# Patient Record
Sex: FEMALE | Race: BLACK OR AFRICAN AMERICAN | NOT HISPANIC OR LATINO | URBAN - METROPOLITAN AREA
[De-identification: names, ages, dates, MRNs, and addresses within clinical notes are randomized per-mention and may not be internally consistent; named-entity substitution may affect disease eponyms.]

---

## 2018-10-15 ENCOUNTER — EMERGENCY (EMERGENCY)
Facility: HOSPITAL | Age: 33
LOS: 0 days | Discharge: HOME | End: 2018-10-15
Attending: EMERGENCY MEDICINE
Payer: OTHER MISCELLANEOUS

## 2018-10-15 VITALS
DIASTOLIC BLOOD PRESSURE: 60 MMHG | TEMPERATURE: 98 F | SYSTOLIC BLOOD PRESSURE: 108 MMHG | HEART RATE: 74 BPM | OXYGEN SATURATION: 100 %

## 2018-10-15 VITALS — WEIGHT: 141.1 LBS | HEIGHT: 64 IN
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
## 2018-10-15 DIAGNOSIS — M25.531 PAIN IN RIGHT WRIST: ICD-10-CM
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
## 2018-10-15 DIAGNOSIS — S09.90XA UNSPECIFIED INJURY OF HEAD, INITIAL ENCOUNTER: ICD-10-CM

## 2018-10-15 DIAGNOSIS — R51 HEADACHE: ICD-10-CM

## 2018-10-15 DIAGNOSIS — S63.501A UNSPECIFIED SPRAIN OF RIGHT WRIST, INITIAL ENCOUNTER: ICD-10-CM

## 2018-10-15 PROCEDURE — 29125 APPL SHORT ARM SPLINT STATIC: CPT | Mod: RT

## 2018-10-15 PROCEDURE — 99283 EMERGENCY DEPT VISIT LOW MDM: CPT

## 2018-10-15 RX ORDER — ACETAMINOPHEN 500 MG
975 TABLET ORAL ONCE
Qty: 0 | Refills: 0 | Status: COMPLETED | OUTPATIENT
Start: 2018-10-15 | End: 2018-10-15

## 2018-10-15 RX ADMIN — Medication 975 MILLIGRAM(S): at 16:41
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
## 2018-10-15 NOTE — ED STATDOCS - PHYSICAL EXAMINATION
Constitutional: mild distress AAOx3  Eyes: PERRLA EOMI. no occular muscle entrapment. No Rowell's sign.   ENT: No hemotympanum.  Head: Normocephalic atraumatic  Mouth: MMM  Cardiac: regular rate   Resp: Lungs CTAB  GI: Abd s/nt/nd  Neuro: CN2-12 intact  Skin: No rashes. No hemotympanum.   MSK: no midline spinal TTP. +mild TTP along R medial eyebrow. No TTP to maxilla or mandible. +mild R TPP of dorsal radius. no snuffbox, metacarpal TTP. normal pulses. Constitutional: NAD AAOx3  Eyes: PERRLA EOMI. no occular muscle entrapment. No Rowell's sign.   ENT: No hemotympanum.  Head: Normocephalic atraumatic  Mouth: MMM  Cardiac: regular rate   Resp: Lungs CTAB  GI: Abd s/nt/nd  Neuro: CN2-12 intact  Skin: No rashes. No hemotympanum.   MSK: no midline spinal TTP. +mild TTP along R medial eyebrow. No TTP to maxilla or mandible. +mild R TPP of dorsal radius. no snuffbox or metacarpal TTP. normal pulses.

## 2018-10-15 NOTE — ED STATDOCS - NS_ ATTENDINGSCRIBEDETAILS _ED_A_ED_FT
I, Buck Pierce MD,  performed the initial face to face bedside interview with this patient regarding history of present illness, review of symptoms and relevant past medical, social and family history.  I completed an independent physical examination.  I was the initial provider who evaluated this patient.  The history, relevant review of systems, past medical and surgical history, medical decision making, and physical examination was documented by the scribe in my presence and I attest to the accuracy of the documentation.
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
## 2018-10-15 NOTE — ED STATDOCS - NS ED ROS FT
Constitutional: No fever or chills  Eyes: No visual changes  HEENT: No throat pain  CV: No chest pain  Resp: No SOB no cough  GI: No abd pain, nausea or vomiting  : No dysuria  MSK: +R wrist pain.  Skin: No rash  Neuro: +HA.
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
## 2019-04-16 ENCOUNTER — APPOINTMENT (OUTPATIENT)
Age: 34
End: 2019-04-16
Payer: COMMERCIAL

## 2019-04-16 ENCOUNTER — ASOB RESULT (OUTPATIENT)
Age: 34
End: 2019-04-16

## 2019-04-16 PROCEDURE — 76813 OB US NUCHAL MEAS 1 GEST: CPT

## 2019-05-05 ENCOUNTER — EMERGENCY (EMERGENCY)
Facility: HOSPITAL | Age: 34
LOS: 0 days | Discharge: HOME | End: 2019-05-05
Attending: STUDENT IN AN ORGANIZED HEALTH CARE EDUCATION/TRAINING PROGRAM | Admitting: STUDENT IN AN ORGANIZED HEALTH CARE EDUCATION/TRAINING PROGRAM
Payer: COMMERCIAL

## 2019-05-05 VITALS
SYSTOLIC BLOOD PRESSURE: 117 MMHG | HEART RATE: 97 BPM | OXYGEN SATURATION: 100 % | TEMPERATURE: 99 F | DIASTOLIC BLOOD PRESSURE: 77 MMHG | RESPIRATION RATE: 18 BRPM

## 2019-05-05 VITALS — SYSTOLIC BLOOD PRESSURE: 112 MMHG | DIASTOLIC BLOOD PRESSURE: 71 MMHG | HEART RATE: 84 BPM

## 2019-05-05 DIAGNOSIS — Z91.013 ALLERGY TO SEAFOOD: ICD-10-CM

## 2019-05-05 DIAGNOSIS — Z3A.16 16 WEEKS GESTATION OF PREGNANCY: ICD-10-CM

## 2019-05-05 DIAGNOSIS — O26.892 OTHER SPECIFIED PREGNANCY RELATED CONDITIONS, SECOND TRIMESTER: ICD-10-CM

## 2019-05-05 DIAGNOSIS — R10.30 LOWER ABDOMINAL PAIN, UNSPECIFIED: ICD-10-CM

## 2019-05-05 DIAGNOSIS — R82.71 BACTERIURIA: ICD-10-CM

## 2019-05-05 LAB
ALBUMIN SERPL ELPH-MCNC: 4 G/DL — SIGNIFICANT CHANGE UP (ref 3.3–5)
ALP SERPL-CCNC: 57 U/L — SIGNIFICANT CHANGE UP (ref 40–120)
ALT FLD-CCNC: 17 U/L — SIGNIFICANT CHANGE UP (ref 12–78)
ANION GAP SERPL CALC-SCNC: 8 MMOL/L — SIGNIFICANT CHANGE UP (ref 5–17)
APPEARANCE UR: CLEAR — SIGNIFICANT CHANGE UP
AST SERPL-CCNC: 16 U/L — SIGNIFICANT CHANGE UP (ref 15–37)
BACTERIA # UR AUTO: ABNORMAL
BASOPHILS # BLD AUTO: 0.03 K/UL — SIGNIFICANT CHANGE UP (ref 0–0.2)
BASOPHILS NFR BLD AUTO: 0.4 % — SIGNIFICANT CHANGE UP (ref 0–2)
BILIRUB SERPL-MCNC: 0.5 MG/DL — SIGNIFICANT CHANGE UP (ref 0.2–1.2)
BILIRUB UR-MCNC: NEGATIVE — SIGNIFICANT CHANGE UP
BLD GP AB SCN SERPL QL: SIGNIFICANT CHANGE UP
BUN SERPL-MCNC: 6 MG/DL — LOW (ref 7–23)
CALCIUM SERPL-MCNC: 8.7 MG/DL — SIGNIFICANT CHANGE UP (ref 8.5–10.1)
CHLORIDE SERPL-SCNC: 106 MMOL/L — SIGNIFICANT CHANGE UP (ref 96–108)
CO2 SERPL-SCNC: 24 MMOL/L — SIGNIFICANT CHANGE UP (ref 22–31)
COLOR SPEC: YELLOW — SIGNIFICANT CHANGE UP
COMMENT - URINE: SIGNIFICANT CHANGE UP
CREAT SERPL-MCNC: 0.68 MG/DL — SIGNIFICANT CHANGE UP (ref 0.5–1.3)
DIFF PNL FLD: NEGATIVE — SIGNIFICANT CHANGE UP
EOSINOPHIL # BLD AUTO: 0.09 K/UL — SIGNIFICANT CHANGE UP (ref 0–0.5)
EOSINOPHIL NFR BLD AUTO: 1.2 % — SIGNIFICANT CHANGE UP (ref 0–6)
EPI CELLS # UR: SIGNIFICANT CHANGE UP
GLUCOSE SERPL-MCNC: 78 MG/DL — SIGNIFICANT CHANGE UP (ref 70–99)
GLUCOSE UR QL: NEGATIVE MG/DL — SIGNIFICANT CHANGE UP
HCG SERPL-ACNC: HIGH MIU/ML
HCT VFR BLD CALC: 36.1 % — SIGNIFICANT CHANGE UP (ref 34.5–45)
HGB BLD-MCNC: 12.3 G/DL — SIGNIFICANT CHANGE UP (ref 11.5–15.5)
IMM GRANULOCYTES NFR BLD AUTO: 0.3 % — SIGNIFICANT CHANGE UP (ref 0–1.5)
KETONES UR-MCNC: NEGATIVE — SIGNIFICANT CHANGE UP
LEUKOCYTE ESTERASE UR-ACNC: NEGATIVE — SIGNIFICANT CHANGE UP
LYMPHOCYTES # BLD AUTO: 1.31 K/UL — SIGNIFICANT CHANGE UP (ref 1–3.3)
LYMPHOCYTES # BLD AUTO: 16.9 % — SIGNIFICANT CHANGE UP (ref 13–44)
MCHC RBC-ENTMCNC: 33.2 PG — SIGNIFICANT CHANGE UP (ref 27–34)
MCHC RBC-ENTMCNC: 34.1 GM/DL — SIGNIFICANT CHANGE UP (ref 32–36)
MCV RBC AUTO: 97.6 FL — SIGNIFICANT CHANGE UP (ref 80–100)
MONOCYTES # BLD AUTO: 0.61 K/UL — SIGNIFICANT CHANGE UP (ref 0–0.9)
MONOCYTES NFR BLD AUTO: 7.9 % — SIGNIFICANT CHANGE UP (ref 2–14)
NEUTROPHILS # BLD AUTO: 5.71 K/UL — SIGNIFICANT CHANGE UP (ref 1.8–7.4)
NEUTROPHILS NFR BLD AUTO: 73.3 % — SIGNIFICANT CHANGE UP (ref 43–77)
NITRITE UR-MCNC: NEGATIVE — SIGNIFICANT CHANGE UP
NRBC # BLD: 0 /100 WBCS — SIGNIFICANT CHANGE UP (ref 0–0)
PH UR: 6 — SIGNIFICANT CHANGE UP (ref 5–8)
PLATELET # BLD AUTO: 288 K/UL — SIGNIFICANT CHANGE UP (ref 150–400)
POTASSIUM SERPL-MCNC: 3.5 MMOL/L — SIGNIFICANT CHANGE UP (ref 3.5–5.3)
POTASSIUM SERPL-SCNC: 3.5 MMOL/L — SIGNIFICANT CHANGE UP (ref 3.5–5.3)
PROT SERPL-MCNC: 8.2 GM/DL — SIGNIFICANT CHANGE UP (ref 6–8.3)
PROT UR-MCNC: 15 MG/DL
RBC # BLD: 3.7 M/UL — LOW (ref 3.8–5.2)
RBC # FLD: 13.2 % — SIGNIFICANT CHANGE UP (ref 10.3–14.5)
RBC CASTS # UR COMP ASSIST: SIGNIFICANT CHANGE UP /HPF (ref 0–4)
SODIUM SERPL-SCNC: 138 MMOL/L — SIGNIFICANT CHANGE UP (ref 135–145)
SP GR SPEC: 1.02 — SIGNIFICANT CHANGE UP (ref 1.01–1.02)
TYPE + AB SCN PNL BLD: SIGNIFICANT CHANGE UP
UROBILINOGEN FLD QL: 1 MG/DL
WBC # BLD: 7.77 K/UL — SIGNIFICANT CHANGE UP (ref 3.8–10.5)
WBC # FLD AUTO: 7.77 K/UL — SIGNIFICANT CHANGE UP (ref 3.8–10.5)
WBC UR QL: SIGNIFICANT CHANGE UP

## 2019-05-05 PROCEDURE — 99284 EMERGENCY DEPT VISIT MOD MDM: CPT

## 2019-05-05 PROCEDURE — 76815 OB US LIMITED FETUS(S): CPT | Mod: 26

## 2019-05-05 RX ORDER — CEPHALEXIN 500 MG
1 CAPSULE ORAL
Qty: 14 | Refills: 0 | OUTPATIENT
Start: 2019-05-05 | End: 2019-05-11

## 2019-05-05 RX ORDER — SODIUM CHLORIDE 9 MG/ML
1000 INJECTION INTRAMUSCULAR; INTRAVENOUS; SUBCUTANEOUS ONCE
Qty: 0 | Refills: 0 | Status: COMPLETED | OUTPATIENT
Start: 2019-05-05 | End: 2019-05-05

## 2019-05-05 RX ORDER — SODIUM CHLORIDE 9 MG/ML
1000 INJECTION INTRAMUSCULAR; INTRAVENOUS; SUBCUTANEOUS
Qty: 0 | Refills: 0 | Status: DISCONTINUED | OUTPATIENT
Start: 2019-05-05 | End: 2019-05-05

## 2019-05-05 RX ADMIN — SODIUM CHLORIDE 1000 MILLILITER(S): 9 INJECTION INTRAMUSCULAR; INTRAVENOUS; SUBCUTANEOUS at 12:42

## 2019-05-05 RX ADMIN — SODIUM CHLORIDE 1000 MILLILITER(S): 9 INJECTION INTRAMUSCULAR; INTRAVENOUS; SUBCUTANEOUS at 11:42

## 2019-05-05 NOTE — ED STATDOCS - ATTENDING CONTRIBUTION TO CARE
I, Qian Guzman DO,  performed the initial face to face bedside interview with this patient regarding history of present illness, review of symptoms and relevant past medical, social and family history.  I completed an independent physical examination.  I was the initial provider who evaluated this patient. I have signed out the follow up of any pending tests (i.e. labs, radiological studies) to the ACP.  I have communicated the patient’s plan of care and disposition with the ACP.  The history, relevant review of systems, past medical and surgical history, medical decision making, and physical examination was documented by the scribe in my presence and I attest to the accuracy of the documentation.

## 2019-05-05 NOTE — ED STATDOCS - PROGRESS NOTE DETAILS
RAÚL Wiley:   Patient has been seen, evaluated and orders have been written by the attending in intake. Patient is stable.  I will follow up the results of orders written and I will continue to evaluate/observe the patient.  Nila Wiley PA-C Spoke with Radiology.  Placenta previa at this time.  F/U on repeat Sono.  Fetal measurements and FHR WNL.  u/A with Bacteria.  Neg Leuk est. nitrite, blood.  Will treat for Asymptomatic Bacteriuria.  Discussed findings with pt.  Start Keflex.  F/U with Dr. Kapoor.   Nila Wiley PA-C

## 2019-05-05 NOTE — ED ADULT NURSE NOTE - CHIEF COMPLAINT QUOTE
pt presents to ED due to abdominal pressure since friday AM denies vaginal bleeding cold like sxs since Thursday

## 2019-05-05 NOTE — ED STATDOCS - OBJECTIVE STATEMENT
35 y/o female 15 weeks pregnant,  with PMHx of miscarriage presents to the ED c/o lower abd pressure x3 days. Pt states when she uses the bathroom she feels pressure on her abd. Reports she has been using the bathroom a lot, which is not abnormal. States she has also had a cough for the past few days. Denies dysuria, vomiting, vaginal bleeding, vaginal discharge. Allergic: Selfish

## 2019-05-05 NOTE — ED STATDOCS - CARE PLAN
Principal Discharge DX:	15 weeks gestation of pregnancy  Secondary Diagnosis:	Bacteriuria during pregnancy

## 2019-05-06 LAB
CULTURE RESULTS: SIGNIFICANT CHANGE UP
SPECIMEN SOURCE: SIGNIFICANT CHANGE UP

## 2019-06-11 ENCOUNTER — ASOB RESULT (OUTPATIENT)
Age: 34
End: 2019-06-11

## 2019-06-11 ENCOUNTER — APPOINTMENT (OUTPATIENT)
Dept: ANTEPARTUM | Facility: CLINIC | Age: 34
End: 2019-06-11
Payer: COMMERCIAL

## 2019-06-11 PROCEDURE — 76805 OB US >/= 14 WKS SNGL FETUS: CPT

## 2019-06-11 PROCEDURE — 76817 TRANSVAGINAL US OBSTETRIC: CPT

## 2019-10-01 ENCOUNTER — ASOB RESULT (OUTPATIENT)
Age: 34
End: 2019-10-01

## 2019-10-01 ENCOUNTER — APPOINTMENT (OUTPATIENT)
Dept: ANTEPARTUM | Facility: CLINIC | Age: 34
End: 2019-10-01
Payer: COMMERCIAL

## 2019-10-01 PROCEDURE — 76816 OB US FOLLOW-UP PER FETUS: CPT

## 2019-10-14 ENCOUNTER — EMERGENCY (EMERGENCY)
Facility: HOSPITAL | Age: 34
LOS: 0 days | Discharge: HOME | End: 2019-10-14
Attending: EMERGENCY MEDICINE
Payer: COMMERCIAL

## 2019-10-14 ENCOUNTER — OUTPATIENT (OUTPATIENT)
Dept: INPATIENT UNIT | Facility: HOSPITAL | Age: 34
LOS: 1 days | Discharge: HOME | End: 2019-10-14
Payer: COMMERCIAL

## 2019-10-14 VITALS
RESPIRATION RATE: 16 BRPM | HEART RATE: 106 BPM | HEIGHT: 64 IN | OXYGEN SATURATION: 98 % | DIASTOLIC BLOOD PRESSURE: 83 MMHG | WEIGHT: 173.94 LBS | SYSTOLIC BLOOD PRESSURE: 126 MMHG | TEMPERATURE: 99 F

## 2019-10-14 VITALS
SYSTOLIC BLOOD PRESSURE: 129 MMHG | RESPIRATION RATE: 17 BRPM | OXYGEN SATURATION: 99 % | HEART RATE: 97 BPM | DIASTOLIC BLOOD PRESSURE: 74 MMHG

## 2019-10-14 DIAGNOSIS — Z3A.38 38 WEEKS GESTATION OF PREGNANCY: ICD-10-CM
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
## 2019-10-14 DIAGNOSIS — O26.899 OTHER SPECIFIED PREGNANCY RELATED CONDITIONS, UNSPECIFIED TRIMESTER: ICD-10-CM

## 2019-10-14 DIAGNOSIS — R10.30 LOWER ABDOMINAL PAIN, UNSPECIFIED: ICD-10-CM

## 2019-10-14 DIAGNOSIS — O99.89 OTHER SPECIFIED DISEASES AND CONDITIONS COMPLICATING PREGNANCY, CHILDBIRTH AND THE PUERPERIUM: ICD-10-CM

## 2019-10-14 DIAGNOSIS — M54.9 DORSALGIA, UNSPECIFIED: ICD-10-CM
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
## 2019-10-14 PROCEDURE — 59025 FETAL NON-STRESS TEST: CPT

## 2019-10-14 PROCEDURE — 99214 OFFICE O/P EST MOD 30 MIN: CPT

## 2019-10-14 PROCEDURE — G0463: CPT

## 2019-10-14 PROCEDURE — 99283 EMERGENCY DEPT VISIT LOW MDM: CPT

## 2019-10-14 PROCEDURE — 99284 EMERGENCY DEPT VISIT MOD MDM: CPT

## 2019-10-14 RX ORDER — ACETAMINOPHEN 500 MG
650 TABLET ORAL ONCE
Refills: 0 | Status: COMPLETED | OUTPATIENT
Start: 2019-10-14 | End: 2019-10-14

## 2019-10-14 RX ADMIN — Medication 650 MILLIGRAM(S): at 11:11

## 2019-10-14 NOTE — ED ADULT NURSE NOTE - OBJECTIVE STATEMENT
Adequate: hears normal conversation without difficulty
Patient presents with lower abdominal pain after fall down three steps. Patient reports she is 38 weeks pregnant. denies vaginal bleeding.

## 2019-10-14 NOTE — ED ADULT TRIAGE NOTE - PAIN RATING/NUMBER SCALE (0-10): ACTIVITY
Denies known Latex allergy or symptoms of Latex sensitivity.  Medications reviewed and updated.     6

## 2019-10-14 NOTE — ED PROVIDER NOTE - NSFOLLOWUPINSTRUCTIONS_ED_ALL_ED_FT
FALL PREVENTION - Ambulatory Care     Fall Prevention    AMBULATORY CARE:    Fall prevention includes ways to make your home and other areas safer. It also includes ways you can move more carefully to prevent a fall. Health conditions that cause changes in your blood pressure, vision, or muscle strength and coordination may increase your risk for falls. Medicines may also increase your risk for falls if they make you dizzy, weak, or sleepy.     Call 911 or have someone else call if:     You have fallen and are unconscious.      You have fallen and cannot move part of your body.    Contact your healthcare provider if:     You have fallen and have pain or a headache.      You have questions or concerns about your condition or care.    Fall prevention tips:     Stand or sit up slowly. This may help you keep your balance and prevent falls.      Use assistive devices as directed. Your healthcare provider may suggest that you use a cane or walker to help you keep your balance. You may need to have grab bars put in your bathroom near the toilet or in the shower.      Wear shoes that fit well and have soles that . Wear shoes both inside and outside. Use slippers with good . Do not wear shoes with high heels.      Wear a personal alarm. This is a device that allows you to call 911 if you fall and need help. Ask your healthcare provider for more information.      Stay active. Exercise can help strengthen your muscles and improve your balance. Your healthcare provider may recommend water aerobics or walking. He or she may also recommend physical therapy to improve your coordination. Never start an exercise program without talking to your healthcare provider first.       Manage your medical conditions. Keep all appointments with your healthcare providers. Visit your eye doctor as directed.    Home safety tips:     Add items to prevent falls in the bathroom. Put nonslip strips on your bath or shower floor to prevent you from slipping. Use a bath mat if you do not have carpet in the bathroom. This will prevent you from falling when you step out of the bath or shower. Use a shower seat so you do not need to stand while you shower. Sit on the toilet or a chair in your bathroom to dry yourself and put on clothing. This will prevent you from losing your balance from drying or dressing yourself while you are standing.       Keep paths clear. Remove books, shoes, and other objects from walkways and stairs. Place cords for telephones and lamps out of the way so that you do not need to walk over them. Tape them down if you cannot move them. Remove small rugs. If you cannot remove a rug, secure it with double-sided tape. This will prevent you from tripping.       Install bright lights in your home. Use night lights to help light paths to the bathroom or kitchen. Always turn on the light before you start walking.      Keep items you use often on shelves within reach. Do not use a step stool to help you reach an item.      Paint or place reflective tape on the edges of your stairs. This will help you see the stairs better.    Follow up with your healthcare provider as directed: Write down your questions so you remember to ask them during your visits.        © Copyright [employer] 2019 All illustrations and images included in CareNotes are the copyrighted property of [employer] Inc[employer] or [employer].      back to top                      © Copyright [employer] 2019

## 2019-10-14 NOTE — ED PROVIDER NOTE - CLINICAL SUMMARY MEDICAL DECISION MAKING FREE TEXT BOX
pt with fall down 3 steps on backside with no pain at this time. will give Tylenol and discuss with ob-gyn

## 2019-10-14 NOTE — ED PROVIDER NOTE - OBJECTIVE STATEMENT
35 y/o female with no PMHx presents to the ED c/o low abd pain/tightness s/p slip and fall onto buttocks, down 3 steps while at work. Pt is 38 weeks pregnant. Denies any current pregnancy complications. Due date is 10/28. Denies vaginal bleeding, any other acute sx in ED at this time.

## 2019-10-14 NOTE — ED PROVIDER NOTE - PATIENT PORTAL LINK FT
0900H   Daughter in room, feeding patient  (clear liquid). Consent for surgical procedure signed. You can access the FollowMyHealth Patient Portal offered by [facility] by registering at the following website: [email or web address]. By joining [facility]’s FollowMyHealth portal, you will also be able to view your health information using other applications (apps) compatible with our system.

## 2019-10-14 NOTE — ED ADULT NURSE NOTE - CHIEF COMPLAINT QUOTE
Pt. to the ED BIBA C/O slipped and fall from 2-4 steps x 15-20 minutes ago PTA- Pt. states she landed on Tail Bone- Denies Head, chest injuries and LOC- GSC 15- 38 Weeks pregnant- + Lower back and abdominal pain- denies Vaginal Bleeding. Denies blood thinners/Aspirin- + Fetal Movement. L&D Charge RN Made aware

## 2019-10-16 DIAGNOSIS — O47.9 FALSE LABOR, UNSPECIFIED: ICD-10-CM

## 2019-10-17 DIAGNOSIS — O47.9 FALSE LABOR, UNSPECIFIED: ICD-10-CM

## 2020-12-03 ENCOUNTER — APPOINTMENT (OUTPATIENT)
Dept: HUMAN REPRODUCTION | Facility: CLINIC | Age: 35
End: 2020-12-03
Payer: COMMERCIAL

## 2020-12-03 PROCEDURE — 99203 OFFICE O/P NEW LOW 30 MIN: CPT | Mod: 95

## 2020-12-08 ENCOUNTER — APPOINTMENT (OUTPATIENT)
Dept: HUMAN REPRODUCTION | Facility: CLINIC | Age: 35
End: 2020-12-08
Payer: COMMERCIAL

## 2020-12-08 PROCEDURE — 36415 COLL VENOUS BLD VENIPUNCTURE: CPT

## 2020-12-08 PROCEDURE — 99213Y: CUSTOM | Mod: 25

## 2020-12-08 PROCEDURE — 76830 TRANSVAGINAL US NON-OB: CPT

## 2020-12-08 PROCEDURE — 99072 ADDL SUPL MATRL&STAF TM PHE: CPT

## 2020-12-16 ENCOUNTER — APPOINTMENT (OUTPATIENT)
Dept: HUMAN REPRODUCTION | Facility: CLINIC | Age: 35
End: 2020-12-16
Payer: COMMERCIAL

## 2020-12-16 PROCEDURE — 99072 ADDL SUPL MATRL&STAF TM PHE: CPT

## 2020-12-16 PROCEDURE — 99213 OFFICE O/P EST LOW 20 MIN: CPT | Mod: 25

## 2020-12-16 PROCEDURE — 76830 TRANSVAGINAL US NON-OB: CPT

## 2020-12-16 PROCEDURE — 36415 COLL VENOUS BLD VENIPUNCTURE: CPT

## 2020-12-23 ENCOUNTER — APPOINTMENT (OUTPATIENT)
Dept: HUMAN REPRODUCTION | Facility: CLINIC | Age: 35
End: 2020-12-23

## 2021-01-03 ENCOUNTER — EMERGENCY (EMERGENCY)
Facility: HOSPITAL | Age: 36
LOS: 0 days | Discharge: HOME | End: 2021-01-03
Attending: EMERGENCY MEDICINE
Payer: COMMERCIAL

## 2021-01-03 VITALS
TEMPERATURE: 100 F | OXYGEN SATURATION: 100 % | DIASTOLIC BLOOD PRESSURE: 68 MMHG | HEART RATE: 87 BPM | SYSTOLIC BLOOD PRESSURE: 123 MMHG

## 2021-01-03 VITALS — WEIGHT: 134.92 LBS | HEIGHT: 64 IN

## 2021-01-03 DIAGNOSIS — N83.209 UNSPECIFIED OVARIAN CYST, UNSPECIFIED SIDE: ICD-10-CM

## 2021-01-03 DIAGNOSIS — R10.9 UNSPECIFIED ABDOMINAL PAIN: ICD-10-CM

## 2021-01-03 DIAGNOSIS — R50.9 FEVER, UNSPECIFIED: ICD-10-CM

## 2021-01-03 DIAGNOSIS — U07.1 COVID-19: ICD-10-CM

## 2021-01-03 DIAGNOSIS — Z91.013 ALLERGY TO SEAFOOD: ICD-10-CM

## 2021-01-03 DIAGNOSIS — M79.10 MYALGIA, UNSPECIFIED SITE: ICD-10-CM

## 2021-01-03 LAB
ADD ON TEST-SPECIMEN IN LAB: SIGNIFICANT CHANGE UP
ALBUMIN SERPL ELPH-MCNC: 4.3 G/DL — SIGNIFICANT CHANGE UP (ref 3.3–5)
ALP SERPL-CCNC: 76 U/L — SIGNIFICANT CHANGE UP (ref 40–120)
ALT FLD-CCNC: 23 U/L — SIGNIFICANT CHANGE UP (ref 12–78)
ANION GAP SERPL CALC-SCNC: 4 MMOL/L — LOW (ref 5–17)
APPEARANCE UR: CLEAR — SIGNIFICANT CHANGE UP
APTT BLD: 34.4 SEC — SIGNIFICANT CHANGE UP (ref 27.5–35.5)
AST SERPL-CCNC: 16 U/L — SIGNIFICANT CHANGE UP (ref 15–37)
BASOPHILS # BLD AUTO: 0.02 K/UL — SIGNIFICANT CHANGE UP (ref 0–0.2)
BASOPHILS NFR BLD AUTO: 0.3 % — SIGNIFICANT CHANGE UP (ref 0–2)
BILIRUB SERPL-MCNC: 0.4 MG/DL — SIGNIFICANT CHANGE UP (ref 0.2–1.2)
BILIRUB UR-MCNC: NEGATIVE — SIGNIFICANT CHANGE UP
BUN SERPL-MCNC: 12 MG/DL — SIGNIFICANT CHANGE UP (ref 7–23)
CALCIUM SERPL-MCNC: 9.1 MG/DL — SIGNIFICANT CHANGE UP (ref 8.5–10.1)
CHLORIDE SERPL-SCNC: 107 MMOL/L — SIGNIFICANT CHANGE UP (ref 96–108)
CO2 SERPL-SCNC: 28 MMOL/L — SIGNIFICANT CHANGE UP (ref 22–31)
COLOR SPEC: YELLOW — SIGNIFICANT CHANGE UP
CREAT SERPL-MCNC: 0.85 MG/DL — SIGNIFICANT CHANGE UP (ref 0.5–1.3)
DIFF PNL FLD: ABNORMAL
EOSINOPHIL # BLD AUTO: 0.06 K/UL — SIGNIFICANT CHANGE UP (ref 0–0.5)
EOSINOPHIL NFR BLD AUTO: 1 % — SIGNIFICANT CHANGE UP (ref 0–6)
GLUCOSE SERPL-MCNC: 98 MG/DL — SIGNIFICANT CHANGE UP (ref 70–99)
GLUCOSE UR QL: NEGATIVE MG/DL — SIGNIFICANT CHANGE UP
HCT VFR BLD CALC: 36.9 % — SIGNIFICANT CHANGE UP (ref 34.5–45)
HGB BLD-MCNC: 11.7 G/DL — SIGNIFICANT CHANGE UP (ref 11.5–15.5)
IMM GRANULOCYTES NFR BLD AUTO: 0.2 % — SIGNIFICANT CHANGE UP (ref 0–1.5)
INR BLD: 1.17 RATIO — HIGH (ref 0.88–1.16)
KETONES UR-MCNC: NEGATIVE — SIGNIFICANT CHANGE UP
LACTATE SERPL-SCNC: 1.4 MMOL/L — SIGNIFICANT CHANGE UP (ref 0.7–2)
LEUKOCYTE ESTERASE UR-ACNC: NEGATIVE — SIGNIFICANT CHANGE UP
LYMPHOCYTES # BLD AUTO: 1.11 K/UL — SIGNIFICANT CHANGE UP (ref 1–3.3)
LYMPHOCYTES # BLD AUTO: 19.2 % — SIGNIFICANT CHANGE UP (ref 13–44)
MCHC RBC-ENTMCNC: 31.4 PG — SIGNIFICANT CHANGE UP (ref 27–34)
MCHC RBC-ENTMCNC: 31.7 GM/DL — LOW (ref 32–36)
MCV RBC AUTO: 98.9 FL — SIGNIFICANT CHANGE UP (ref 80–100)
MONOCYTES # BLD AUTO: 0.65 K/UL — SIGNIFICANT CHANGE UP (ref 0–0.9)
MONOCYTES NFR BLD AUTO: 11.3 % — SIGNIFICANT CHANGE UP (ref 2–14)
NEUTROPHILS # BLD AUTO: 3.92 K/UL — SIGNIFICANT CHANGE UP (ref 1.8–7.4)
NEUTROPHILS NFR BLD AUTO: 68 % — SIGNIFICANT CHANGE UP (ref 43–77)
NITRITE UR-MCNC: NEGATIVE — SIGNIFICANT CHANGE UP
PH UR: 7 — SIGNIFICANT CHANGE UP (ref 5–8)
PLATELET # BLD AUTO: 305 K/UL — SIGNIFICANT CHANGE UP (ref 150–400)
POTASSIUM SERPL-MCNC: 3.7 MMOL/L — SIGNIFICANT CHANGE UP (ref 3.5–5.3)
POTASSIUM SERPL-SCNC: 3.7 MMOL/L — SIGNIFICANT CHANGE UP (ref 3.5–5.3)
PROT SERPL-MCNC: 8 GM/DL — SIGNIFICANT CHANGE UP (ref 6–8.3)
PROT UR-MCNC: NEGATIVE MG/DL — SIGNIFICANT CHANGE UP
PROTHROM AB SERPL-ACNC: 13.5 SEC — SIGNIFICANT CHANGE UP (ref 10.6–13.6)
RAPID RVP RESULT: DETECTED
RBC # BLD: 3.73 M/UL — LOW (ref 3.8–5.2)
RBC # FLD: 12.8 % — SIGNIFICANT CHANGE UP (ref 10.3–14.5)
SARS-COV-2 RNA SPEC QL NAA+PROBE: DETECTED
SODIUM SERPL-SCNC: 139 MMOL/L — SIGNIFICANT CHANGE UP (ref 135–145)
SP GR SPEC: 1.01 — SIGNIFICANT CHANGE UP (ref 1.01–1.02)
UROBILINOGEN FLD QL: NEGATIVE MG/DL — SIGNIFICANT CHANGE UP
WBC # BLD: 5.77 K/UL — SIGNIFICANT CHANGE UP (ref 3.8–10.5)
WBC # FLD AUTO: 5.77 K/UL — SIGNIFICANT CHANGE UP (ref 3.8–10.5)

## 2021-01-03 PROCEDURE — 80053 COMPREHEN METABOLIC PANEL: CPT

## 2021-01-03 PROCEDURE — 83605 ASSAY OF LACTIC ACID: CPT

## 2021-01-03 PROCEDURE — 36415 COLL VENOUS BLD VENIPUNCTURE: CPT

## 2021-01-03 PROCEDURE — 81001 URINALYSIS AUTO W/SCOPE: CPT

## 2021-01-03 PROCEDURE — 85025 COMPLETE CBC W/AUTO DIFF WBC: CPT

## 2021-01-03 PROCEDURE — 93010 ELECTROCARDIOGRAM REPORT: CPT

## 2021-01-03 PROCEDURE — 76830 TRANSVAGINAL US NON-OB: CPT

## 2021-01-03 PROCEDURE — 96374 THER/PROPH/DIAG INJ IV PUSH: CPT | Mod: XU

## 2021-01-03 PROCEDURE — 74177 CT ABD & PELVIS W/CONTRAST: CPT

## 2021-01-03 PROCEDURE — 74177 CT ABD & PELVIS W/CONTRAST: CPT | Mod: 26

## 2021-01-03 PROCEDURE — 71045 X-RAY EXAM CHEST 1 VIEW: CPT

## 2021-01-03 PROCEDURE — 87086 URINE CULTURE/COLONY COUNT: CPT

## 2021-01-03 PROCEDURE — 99284 EMERGENCY DEPT VISIT MOD MDM: CPT | Mod: 25

## 2021-01-03 PROCEDURE — 76830 TRANSVAGINAL US NON-OB: CPT | Mod: 26

## 2021-01-03 PROCEDURE — 99285 EMERGENCY DEPT VISIT HI MDM: CPT

## 2021-01-03 PROCEDURE — 85610 PROTHROMBIN TIME: CPT

## 2021-01-03 PROCEDURE — 85730 THROMBOPLASTIN TIME PARTIAL: CPT

## 2021-01-03 PROCEDURE — 84702 CHORIONIC GONADOTROPIN TEST: CPT

## 2021-01-03 PROCEDURE — 71045 X-RAY EXAM CHEST 1 VIEW: CPT | Mod: 26

## 2021-01-03 PROCEDURE — 87040 BLOOD CULTURE FOR BACTERIA: CPT

## 2021-01-03 PROCEDURE — 0225U NFCT DS DNA&RNA 21 SARSCOV2: CPT

## 2021-01-03 PROCEDURE — 93005 ELECTROCARDIOGRAM TRACING: CPT

## 2021-01-03 RX ORDER — ACETAMINOPHEN 500 MG
650 TABLET ORAL ONCE
Refills: 0 | Status: COMPLETED | OUTPATIENT
Start: 2021-01-03 | End: 2021-01-03

## 2021-01-03 RX ORDER — SODIUM CHLORIDE 9 MG/ML
1900 INJECTION INTRAMUSCULAR; INTRAVENOUS; SUBCUTANEOUS ONCE
Refills: 0 | Status: COMPLETED | OUTPATIENT
Start: 2021-01-03 | End: 2021-01-03

## 2021-01-03 RX ORDER — KETOROLAC TROMETHAMINE 30 MG/ML
30 SYRINGE (ML) INJECTION ONCE
Refills: 0 | Status: DISCONTINUED | OUTPATIENT
Start: 2021-01-03 | End: 2021-01-03

## 2021-01-03 RX ADMIN — Medication 650 MILLIGRAM(S): at 18:18

## 2021-01-03 RX ADMIN — SODIUM CHLORIDE 1900 MILLILITER(S): 9 INJECTION INTRAMUSCULAR; INTRAVENOUS; SUBCUTANEOUS at 18:18

## 2021-01-03 RX ADMIN — Medication 30 MILLIGRAM(S): at 18:35

## 2021-01-03 NOTE — ED STATDOCS - NSFOLLOWUPINSTRUCTIONS_ED_ALL_ED_FT
Please return to us immediately if you have any worsening of symptoms or if any other emergent concerns. Please note that you were seen in the emergency room today for fever, muscle aches, and feeling of pain in the right side of the abdomen. We performed an ultrasound of your abdomen, of ovaries and there was no evidence of "torsion" or twisting of the ovaries. We also performed a CAT scan (CT scan) of the abdomen which did not show any evidence of acute emergencies including no evidence of obstruction, infection or other acute emergencies. We have discussed the results of the blood work in detail. We provided you with hard copies of all the labs and imaging including any finding of the incidental findings. Please follow up with your primary care physician, and also see your gastroenterologist and gynecologist. If you do not have these physician, then contact the number provided for you here.    For all other health concerns please return to us immediately. For all other health concerns, return to us immediately. Also please note you were tested for COVID-19 as this is part of the differential diagnosis for your fever, so until you get the results please self isolate. If you have any issues return to us immediately.     _______________      Fever in Adults    WHAT YOU NEED TO KNOW:    A fever is an increase in your body temperature. Normal body temperature is 98.6°F (37°C). Fever is generally defined as greater than 100.4°F (38°C). Common causes include an infection, injury, or disease such as arthritis.    DISCHARGE INSTRUCTIONS:    Return to the emergency department if:   •Your fever does not go away or gets worse even after treatment.      •You have a stiff neck and a bad headache.       •You are confused. You may not be able to think clearly or remember things like you normally do.       •Your heart beats faster than usual even after treatment.      •You have shortness of breath or chest pain when you breathe.      •You urinate small amounts or not at all.       •Your skin, lips, or nails turn blue.       Contact your healthcare provider if:   •You have abdominal pain or you feel bloated.      •You have nausea or are vomiting.      •You have pain or burning when you urinate, or you have pain in your back.      •You have questions or concerns about your condition or care.       Medicines: You may need any of the following:   •NSAIDs, such as ibuprofen, help decrease swelling, pain, and fever. This medicine is available with or without a doctor's order. NSAIDs can cause stomach bleeding or kidney problems in certain people. If you take blood thinner medicine, always ask if NSAIDs are safe for you. Always read the medicine label and follow directions. Do not give these medicines to children under 6 months of age without direction from your child's healthcare provider.      •Acetaminophen decreases pain and fever. It is available without a doctor's order. Ask how much to take and how often to take it. Follow directions. Read the labels of all other medicines you are using to see if they also contain acetaminophen, or ask your doctor or pharmacist. Acetaminophen can cause liver damage if not taken correctly. Do not use more than 4 grams (4,000 milligrams) total of acetaminophen in one day.       •Antibiotics may be given if you have an infection caused by bacteria.      •Take your medicine as directed. Contact your healthcare provider if you think your medicine is not helping or if you have side effects. Tell him of her if you are allergic to any medicine. Keep a list of the medicines, vitamins, and herbs you take. Include the amounts, and when and why you take them. Bring the list or the pill bottles to follow-up visits. Carry your medicine list with you in case of an emergency.      Follow up with your healthcare provider as directed: Write down your questions so you remember to ask them during your visits.    Self-care:   •Drink more liquids as directed. A fever makes you sweat. This can increase your risk for dehydration. Liquids can help prevent dehydration. ?Drink at least 6 to 8 eight-ounce cups of clear liquids each day. Drink water, juice, or broth. Do not drink sports drinks. They may contain caffeine.      ?Ask your healthcare provider if you should drink an oral rehydration solution (ORS). An ORS has the right amounts of water, salts, and sugar you need to replace body fluids.      •Dress in lightweight clothes. Shivers may be a sign that your fever is rising. Do not put extra blankets or clothes on. This may cause your fever to rise even higher. Dress in light, comfortable clothing. Use a lightweight blanket or sheet when you sleep. Change your clothes, blanket, or sheets if they get wet.      •Cool yourself safely. Take a bath in cool or lukewarm water. Use an ice pack wrapped in a small towel or wet a washcloth with cool water. Place the ice pack or wet washcloth on your forehead or the back of your neck.     __________      Abdominal Pain    WHAT YOU NEED TO KNOW:    Abdominal pain can be dull, achy, or sharp. You may have pain in one area of your abdomen, or in your entire abdomen. Your pain may be caused by a condition such as constipation, food sensitivity or poisoning, infection, or a blockage. Abdominal pain can also be from a hernia, appendicitis, or an ulcer. Liver, gallbladder, or kidney conditions can also cause abdominal pain. The cause of your abdominal pain may be unknown.     DISCHARGE INSTRUCTIONS:    Return to the emergency department if:   •You have new chest pain or shortness of breath.      •You have pulsing pain in your upper abdomen or lower back that suddenly becomes constant.      •Your pain is in the right lower abdominal area and worsens with movement.       •You have a fever over 100.4°F (38°C) or shaking chills.       •You are vomiting and cannot keep food or liquids down.       •Your pain does not improve or gets worse over the next 8 to 12 hours.       •You see blood in your vomit or bowel movements, or they look black and tarry.       •Your skin or the whites of your eyes turn yellow.       •You are a woman and have a large amount of vaginal bleeding that is not your monthly period.       Contact your healthcare provider if:   •You have pain in your lower back.       •You are a man and have pain in your testicles.      •You have pain when you urinate.       •You have questions or concerns about your condition or care.      Follow up with your healthcare provider within 24 hours or as directed: Write down your questions so you remember to ask them during your visits.     Medicines:   •Medicines may be given to calm your stomach and prevent vomiting or to decrease pain. Ask how to take pain medicine safely.      •Take your medicine as directed. Contact your healthcare provider if you think your medicine is not helping or if you have side effects. Tell him of her if you are allergic to any medicine. Keep a list of the medicines, vitamins, and herbs you take. Include the amounts, and when and why you take them. Bring the list or the pill bottles to follow-up visits. Carry your medicine list with you in case of an emergency.

## 2021-01-03 NOTE — ED STATDOCS - PROGRESS NOTE DETAILS
34 yo female presents with diffuse myalgia, headache that started yesterday with a fever T max of 102F and having R lower abdominal pain with frequency of urination. Pt states she had a h/o of ovarian cyst on the R s/p cystectomy. Denies cough, n/v/d. Pt currently at the end of her menstrual cycle. Labs, CT, sono, UA, CXR, Reeval. -Darrius Maria PA-C Ino Jhaveri: X ray shows no acute disease

## 2021-01-03 NOTE — ED STATDOCS - ATTENDING CONTRIBUTION TO CARE
I Alec Jhaveri MD saw and examined the patient. MLP saw and examined the patient under my supervision. I discussed the care of the patient with MLP and agree with MLP's plan, assessment and care of the patient while in the ED.

## 2021-01-03 NOTE — ED ADULT TRIAGE NOTE - CHIEF COMPLAINT QUOTE
fever tmax 101 x couple of hours. + headache. Denies nausea, vomiting, diarrhea, sick contacts. No medication taken at home.

## 2021-01-03 NOTE — ED ADULT NURSE NOTE - OBJECTIVE STATEMENT
pt presents to ed ambulatory for evaluation of muscle aches and worsening right abdominal pain. pt reports urinary frequency, currently on period. pt reports fever at home and headache, did not take any meds. pt in no distress, vitals stable, ambulatory. ekg done

## 2021-01-03 NOTE — ED STATDOCS - CLINICAL SUMMARY MEDICAL DECISION MAKING FREE TEXT BOX
pt with hx of salpingitis and RLQ pain and fever, differentials include appendicitis, salpingitis, uti, CT of abd and us of abd, blood work, pain management, antipyretic management, CXR, covid swab.

## 2021-01-03 NOTE — ED STATDOCS - CARE PLAN
Principal Discharge DX:	Abdominal pain, unspecified abdominal location  Secondary Diagnosis:	Fever, unspecified fever cause  Secondary Diagnosis:	Myalgia

## 2021-01-03 NOTE — ED ADULT NURSE NOTE - NSIMPLEMENTINTERV_GEN_ALL_ED
Implemented All Universal Safety Interventions:  [place] to call system. Call bell, personal items and telephone within reach. Instruct patient to call for assistance. Room bathroom lighting operational. Non-slip footwear when patient is off stretcher. Physically safe environment: no spills, clutter or unnecessary equipment. Stretcher in lowest position, wheels locked, appropriate side rails in place.

## 2021-01-03 NOTE — ED STATDOCS - NEUROLOGICAL, MLM
sensation is normal and strength is normal. NIH  0, GCS 15, 5/5 strength in upper and lower extremities, 2+ b/l DP and radial arteries

## 2021-01-03 NOTE — ED STATDOCS - NS_ ATTENDINGSCRIBEDETAILS _ED_A_ED_FT
I Alec Jhaveri MD saw and examined the patient. Scribe documented for me and under my supervision. I have modified the scribe's documentation where necessary to reflect my history, physical exam and other relevant documentations pertinent to the care of the patient.

## 2021-01-03 NOTE — ED STATDOCS - CARE PROVIDER_API CALL
Buck Hoover  GASTROENTEROLOGY  [address] [place] [address]  Phone: [phone]  Fax: [phone]  Follow Up Time:     Arlin Arevalo  OBSTETRICS AND GYNECOLOGY  [address] [place] [address]  Phone: [phone]  Fax: [phone]  Follow Up Time:

## 2021-01-03 NOTE — ED STATDOCS - PATIENT PORTAL LINK FT
You can access the FollowMyHealth Patient Portal offered by [facility] by registering at the following website: [email or web address]. By joining [employer]’s FollowMyHealth portal, you will also be able to view your health information using other applications (apps) compatible with our system.

## 2021-01-03 NOTE — ED ADULT NURSE NOTE - NS TRANSFER PATIENT BELONGINGS
Multiple vertebral segments in cervical and thoracic and lumbar region misaligned. Manual osteopathic manipulative therapy performed and immediate relief obtained. Patient instantaneously improved. None

## 2021-01-03 NOTE — ED STATDOCS - OBJECTIVE STATEMENT
34 y/o female with significant PMHx of salpingitis, ovarian cyst, cystectomy on the R, presents to the ED CC 2 days of diffuse myalgias, including muscles aches in the thigh and muscle pain in the head. No visual complaints, neck stiffness, neck pain, rashes, nausea, vomiting. Pt with gradually worsening RLQ pain for the last couple of days as well as "R adnexal". Pain is sharp, constant, gradually worsening. Also with some urinary frequency, no blood in urine, pt currently is in the end of menstruation which is expected for her. Denies pregnancy, is sexually active with 1 male partner. No hx of STD/STI per pt and partner. 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

## 2021-01-04 LAB
CULTURE RESULTS: SIGNIFICANT CHANGE UP
SPECIMEN SOURCE: SIGNIFICANT CHANGE UP

## 2021-01-05 NOTE — ED POST DISCHARGE NOTE - RESULT SUMMARY
10-49,000 CFU/ml strep Patient contacted no fever or chills, no dysuria or urgency, no urinary symptoms. No Rx. required. MTangredi NP

## 2021-01-08 LAB
CULTURE RESULTS: SIGNIFICANT CHANGE UP
CULTURE RESULTS: SIGNIFICANT CHANGE UP
SPECIMEN SOURCE: SIGNIFICANT CHANGE UP
SPECIMEN SOURCE: SIGNIFICANT CHANGE UP

## 2021-01-21 ENCOUNTER — APPOINTMENT (OUTPATIENT)
Dept: GASTROENTEROLOGY | Facility: CLINIC | Age: 36
End: 2021-01-21
Payer: COMMERCIAL

## 2021-01-21 VITALS
SYSTOLIC BLOOD PRESSURE: 104 MMHG | BODY MASS INDEX: 22.6 KG/M2 | HEART RATE: 73 BPM | HEIGHT: 64.75 IN | WEIGHT: 134 LBS | DIASTOLIC BLOOD PRESSURE: 65 MMHG

## 2021-01-21 DIAGNOSIS — Z12.11 ENCOUNTER FOR SCREENING FOR MALIGNANT NEOPLASM OF COLON: ICD-10-CM

## 2021-01-21 PROCEDURE — 99202 OFFICE O/P NEW SF 15 MIN: CPT

## 2021-01-21 PROCEDURE — 99072 ADDL SUPL MATRL&STAF TM PHE: CPT

## 2021-01-21 RX ORDER — SODIUM PICOSULFATE, MAGNESIUM OXIDE, AND ANHYDROUS CITRIC ACID 10; 3.5; 12 MG/160ML; G/160ML; G/160ML
10-3.5-12 MG-GM LIQUID ORAL
Qty: 1 | Refills: 0 | Status: ACTIVE | COMMUNITY
Start: 2021-01-21 | End: 1900-01-01

## 2021-01-21 NOTE — HISTORY OF PRESENT ILLNESS
[de-identified] : Ms. JL BOSS is a 35 year old female presents for screening colonoscopy. Patient has no complaints of bowel issues, bleeding, abdominal pain. Patient has strong family history of colon cancer. Patient had last colonoscopy in 2014.

## 2021-02-03 DIAGNOSIS — Z01.818 ENCOUNTER FOR OTHER PREPROCEDURAL EXAMINATION: ICD-10-CM

## 2021-02-05 ENCOUNTER — APPOINTMENT (OUTPATIENT)
Dept: DISASTER EMERGENCY | Facility: CLINIC | Age: 36
End: 2021-02-05

## 2021-02-06 LAB — SARS-COV-2 N GENE NPH QL NAA+PROBE: DETECTED

## 2021-04-23 ENCOUNTER — APPOINTMENT (OUTPATIENT)
Dept: DISASTER EMERGENCY | Facility: CLINIC | Age: 36
End: 2021-04-23

## 2021-04-24 LAB — SARS-COV-2 N GENE NPH QL NAA+PROBE: NOT DETECTED

## 2021-04-27 ENCOUNTER — APPOINTMENT (OUTPATIENT)
Dept: GASTROENTEROLOGY | Facility: AMBULATORY MEDICAL SERVICES | Age: 36
End: 2021-04-27
Payer: COMMERCIAL

## 2021-04-27 PROCEDURE — 45378 DIAGNOSTIC COLONOSCOPY: CPT

## 2021-10-05 ENCOUNTER — APPOINTMENT (OUTPATIENT)
Dept: ANTEPARTUM | Facility: CLINIC | Age: 36
End: 2021-10-05
Payer: COMMERCIAL

## 2021-10-05 ENCOUNTER — ASOB RESULT (OUTPATIENT)
Age: 36
End: 2021-10-05

## 2021-10-05 PROCEDURE — 76813 OB US NUCHAL MEAS 1 GEST: CPT

## 2021-10-12 ENCOUNTER — APPOINTMENT (OUTPATIENT)
Dept: ANTEPARTUM | Facility: CLINIC | Age: 36
End: 2021-10-12

## 2021-11-05 NOTE — ED ADULT TRIAGE NOTE - CHIEF COMPLAINT QUOTE
pt presents to ED due to abdominal pressure since friday AM denies vaginal bleeding cold like sxs since Thursday
No

## 2021-12-06 ENCOUNTER — APPOINTMENT (OUTPATIENT)
Dept: ANTEPARTUM | Facility: CLINIC | Age: 36
End: 2021-12-06
Payer: COMMERCIAL

## 2021-12-06 ENCOUNTER — ASOB RESULT (OUTPATIENT)
Age: 36
End: 2021-12-06

## 2021-12-06 PROCEDURE — 76811 OB US DETAILED SNGL FETUS: CPT

## 2021-12-06 PROCEDURE — 76817 TRANSVAGINAL US OBSTETRIC: CPT

## 2022-03-28 ENCOUNTER — ASOB RESULT (OUTPATIENT)
Age: 37
End: 2022-03-28

## 2022-03-28 ENCOUNTER — APPOINTMENT (OUTPATIENT)
Dept: ANTEPARTUM | Facility: CLINIC | Age: 37
End: 2022-03-28
Payer: COMMERCIAL

## 2022-03-28 PROCEDURE — 76816 OB US FOLLOW-UP PER FETUS: CPT

## 2022-04-06 ENCOUNTER — APPOINTMENT (OUTPATIENT)
Dept: ORTHOPEDIC SURGERY | Facility: CLINIC | Age: 37
End: 2022-04-06
Payer: OTHER MISCELLANEOUS

## 2022-04-06 VITALS — HEIGHT: 65 IN | WEIGHT: 134 LBS | BODY MASS INDEX: 22.33 KG/M2

## 2022-04-06 DIAGNOSIS — M54.9 DORSALGIA, UNSPECIFIED: ICD-10-CM

## 2022-04-06 DIAGNOSIS — Z78.9 OTHER SPECIFIED HEALTH STATUS: ICD-10-CM

## 2022-04-06 PROCEDURE — 99203 OFFICE O/P NEW LOW 30 MIN: CPT

## 2022-04-06 PROCEDURE — 99072 ADDL SUPL MATRL&STAF TM PHE: CPT

## 2022-04-06 NOTE — HISTORY OF PRESENT ILLNESS
[Lower back] : lower back [6] : 6 [4] : 4 [Burning] : burning [Shooting] : shooting [Frequent] : frequent [Rest] : rest [Exercising] : exercising [Full time] : Work status: full time [de-identified] : 4/6/22: Here for f/u. State WC did not approve any more PT or acupuncture. 9 months pregnant, due in two weeks. States acupuncture was helping. No new changes in pain. States pain depends on much activity she undergoes.  [] : Post Surgical Visit: no [FreeTextEntry7] : down arm and legs  [de-identified] : None

## 2022-04-06 NOTE — ASSESSMENT
[FreeTextEntry1] : 35 y/o F with continued lumbar pain. Patient is 9 months pregnant.\par Will restart acupuncture and pain management once patient gives birth\par RTO 6-8 weeks

## 2022-06-01 ENCOUNTER — APPOINTMENT (OUTPATIENT)
Dept: ORTHOPEDIC SURGERY | Facility: CLINIC | Age: 37
End: 2022-06-01
Payer: OTHER MISCELLANEOUS

## 2022-06-01 DIAGNOSIS — M47.26 OTHER SPONDYLOSIS WITH RADICULOPATHY, LUMBAR REGION: ICD-10-CM

## 2022-06-01 DIAGNOSIS — M54.16 RADICULOPATHY, LUMBAR REGION: ICD-10-CM

## 2022-06-01 PROCEDURE — 99213 OFFICE O/P EST LOW 20 MIN: CPT

## 2022-06-01 PROCEDURE — 99072 ADDL SUPL MATRL&STAF TM PHE: CPT

## 2022-06-01 NOTE — HISTORY OF PRESENT ILLNESS
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 Straightening of lordosis.\par 2. C3-C4: Loss of disc signal. Broad bulge with no herniation or stenosis. Luschka hypertrophy with no foraminal encroachment.\par 3. C4-C5: Loss of disc signal. Broad bulge and central herniation with no contact of cord or stenosis. Luschka hypertrophy with no foraminal encroachment\par \par MRi L spine 2/5/20 - 1. Convex right curvature.\par 2. L4-L5: No herniation, foraminal encroachment or stenosis. Facet hypertrophy and ligamentum flavum hypertrophy\par 3. L5-S1: Loss of disc signal and height. Minor retrolisthesis. Broad bulge and facet hypertrophy with foraminal encroachment and impingement on the exiting nerve roots. Central herniation and annular fissure impressing on the thecal sac with mild central stenosis.\par \par emg/ncs not done\par \par 9/8/21: here for f/u. Plan at last "c4.3 for the neck and back done - rec\par acupuncture for pain relief." She is at baseline with her pain and sx. She does well with acupuncture.\par \par 11/3/21: here for f/up. she has not been to acupuncture since last visit due to her schedule. She is 4 months pregnant today.\par \par 2/2/22: here for - pain in the neck to the right shoulder - spasms and at times feels like its pulsing. 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 Patient is 9 months pregnant. Will restart acupuncture and pain management once patient gives birth. RTO 6-8 weeks. Overall in pain still, pain to the right side of the neck that shoots down the arm occassionally. No new onset weakness. She is out of work on maternity leave.  [] : Post Surgical Visit: no [FreeTextEntry1] : c spine and right shoulder  [de-identified] : xrays mris  [de-identified] : None

## 2022-06-01 NOTE — WORK
[Consistent with my objective findings] : consistent with my objective findings [Can return to work without limitations on ______] : can return to work without limitations on [unfilled]

## 2022-06-01 NOTE — DISCUSSION/SUMMARY
[de-identified] : Will continue to request acupuncture as this has been helpful  - limited on meds as she is breastfeeding and post partum - she is out on maternity leave right now - fu 2 months

## 2022-08-10 ENCOUNTER — APPOINTMENT (OUTPATIENT)
Dept: ORTHOPEDIC SURGERY | Facility: CLINIC | Age: 37
End: 2022-08-10

## 2022-08-10 PROCEDURE — 99213 OFFICE O/P EST LOW 20 MIN: CPT

## 2022-08-10 PROCEDURE — 99072 ADDL SUPL MATRL&STAF TM PHE: CPT

## 2022-08-10 NOTE — HISTORY OF PRESENT ILLNESS
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 Straightening of lordosis.\par 2. C3-C4: Loss of disc signal. Broad bulge with no herniation or stenosis. Luschka hypertrophy with no foraminal encroachment.\par 3. C4-C5: Loss of disc signal. Broad bulge and central herniation with no contact of cord or stenosis. Luschka hypertrophy with no foraminal encroachment\par \par MRi L spine 2/5/20 - 1. Convex right curvature.\par 2. L4-L5: No herniation, foraminal encroachment or stenosis. Facet hypertrophy and ligamentum flavum hypertrophy\par 3. L5-S1: Loss of disc signal and height. Minor retrolisthesis. Broad bulge and facet hypertrophy with foraminal encroachment and impingement on the exiting nerve roots. Central herniation and annular fissure impressing on the thecal sac with mild central stenosis.\par \par emg/ncs not done\par \par 9/8/21: here for f/u. Plan at last "c4.3 for the neck and back done - rec\par acupuncture for pain relief." She is at baseline with her pain and sx. She does well with acupuncture.\par \par 11/3/21: here for f/up. she has not been to acupuncture since last visit due to her schedule. She is 4 months pregnant today.\par \par 2/2/22: here for - pain in the neck to the right shoulder - spasms and at times feels like its pulsing. 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 Patient is 9 months pregnant. Will restart acupuncture and pain management once patient gives birth. RTO 6-8 weeks. Overall in pain still, pain to the right side of the neck that shoots down the arm occassionally. No new onset weakness. She is out of work on maternity leave.\par \par 8/10/22: Here for follow up. No change in symptoms. Back to work full duty. Has not been able to make it to acupuncture as much as she wants. Currently nursing. [] : Post Surgical Visit: no [FreeTextEntry1] : c spine and right shoulder  [FreeTextEntry7] : down into the right shoulder  [de-identified] : None

## 2022-08-10 NOTE — DISCUSSION/SUMMARY
[de-identified] : Will continue to request acupuncture as this has been helpful  - limited on meds as she is breastfeeding and post partum - working full duty - fu 8 weeks

## 2022-10-12 ENCOUNTER — APPOINTMENT (OUTPATIENT)
Dept: ORTHOPEDIC SURGERY | Facility: CLINIC | Age: 37
End: 2022-10-12

## 2022-10-12 PROCEDURE — 99072 ADDL SUPL MATRL&STAF TM PHE: CPT

## 2022-10-12 PROCEDURE — 99213 OFFICE O/P EST LOW 20 MIN: CPT

## 2022-10-12 PROCEDURE — 99214 OFFICE O/P EST MOD 30 MIN: CPT

## 2022-10-12 NOTE — DISCUSSION/SUMMARY
[de-identified] : Will re start PT and accupuncture as this has been helpful - she is breastfeeding so she is limited on the medications she can take. She is working part time, full duty - fu 6 weeks

## 2022-10-12 NOTE — WORK
[Consistent with my objective findings] : consistent with my objective findings [Partial] : partial [Does not reveal pre-existing condition(s) that may affect treatment/prognosis] : does not reveal pre-existing condition(s) that may affect treatment/prognosis [Can return to work without limitations on ______] : can return to work without limitations on [unfilled]

## 2022-11-30 ENCOUNTER — APPOINTMENT (OUTPATIENT)
Dept: ORTHOPEDIC SURGERY | Facility: CLINIC | Age: 37
End: 2022-11-30

## 2022-11-30 PROCEDURE — 99072 ADDL SUPL MATRL&STAF TM PHE: CPT

## 2022-11-30 PROCEDURE — 99213 OFFICE O/P EST LOW 20 MIN: CPT

## 2022-11-30 PROCEDURE — ZZZZZ: CPT

## 2022-11-30 NOTE — DISCUSSION/SUMMARY
[de-identified] : Will continue to recommend acupuncture as this has been successful in the past - she is breastfeeding so she is limited on the medications she can take. She is working part time, full duty - fu 6 weeks

## 2022-11-30 NOTE — HISTORY OF PRESENT ILLNESS
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 Straightening of lordosis.\par 2. C3-C4: Loss of disc signal. Broad bulge with no herniation or stenosis. Luschka hypertrophy with no foraminal encroachment.\par 3. C4-C5: Loss of disc signal. Broad bulge and central herniation with no contact of cord or stenosis. Luschka hypertrophy with no foraminal encroachment\par \par MRi L spine 2/5/20 - 1. Convex right curvature.\par 2. L4-L5: No herniation, foraminal encroachment or stenosis. Facet hypertrophy and ligamentum flavum hypertrophy\par 3. L5-S1: Loss of disc signal and height. Minor retrolisthesis. Broad bulge and facet hypertrophy with foraminal encroachment and impingement on the exiting nerve roots. Central herniation and annular fissure impressing on the thecal sac with mild central stenosis.\par \par emg/ncs not done\par \par 9/8/21: here for f/u. Plan at last "c4.3 for the neck and back done - rec\par acupuncture for pain relief." She is at baseline with her pain and sx. She does well with acupuncture.\par \par 11/3/21: here for f/up. she has not been to acupuncture since last visit due to her schedule. She is 4 months pregnant today.\par \par 2/2/22: here for - pain in the neck to the right shoulder - spasms and at times feels like its pulsing. 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 Patient is 9 months pregnant. Will restart acupuncture and pain management once patient gives birth. RTO 6-8 weeks. Overall in pain still, pain to the right side of the neck that shoots down the arm occassionally. No new onset weakness. She is out of work on maternity leave.\par \par 8/10/22: Here for follow up. No change in symptoms. Back to work full duty. Has not been able to make it to acupuncture as much as she wants. Currently nursing.\par \par 10/12/22: Here for fu; plan last visit was, "Will continue to request acupuncture as this has been helpful - limited on meds as she is breastfeeding and post partum - working full duty - fu 8 weeks. " Overall doing worse, feels pain to the right  side of the neck; recently started working part time instead of full time and feels she will have more time to get treatment.\par \par 11/30/22: Here for fu; plan last visit was, "Will re start PT and accupuncture as this has been helpful - she is breastfeeding so she is limited on the medications she can take. She is working part time, full duty - fu 6 weeks" Overall good days and bad days; pain to the right trap at times.- has been unable to get acupuncture, but has recently gone down to part time. No new onset weakness. [] : Post Surgical Visit: no [FreeTextEntry1] : c spine and right shoulder  [FreeTextEntry7] : down into the right shoulder  [de-identified] : acupuncture

## 2023-01-01 NOTE — ED ADULT NURSE NOTE - CAS DISCH CONDITION
His COVID, influenza and RSV tests are negative.  Keep a close eye on him if he develops any difficulty breathing, poor feeding, vomiting, any other concerns please return back to the emergency room.  
Stable

## 2023-01-11 ENCOUNTER — APPOINTMENT (OUTPATIENT)
Dept: ORTHOPEDIC SURGERY | Facility: CLINIC | Age: 38
End: 2023-01-11
Payer: OTHER MISCELLANEOUS

## 2023-01-11 DIAGNOSIS — M54.12 RADICULOPATHY, CERVICAL REGION: ICD-10-CM

## 2023-01-11 DIAGNOSIS — M54.2 CERVICALGIA: ICD-10-CM

## 2023-01-11 PROCEDURE — 99213 OFFICE O/P EST LOW 20 MIN: CPT

## 2023-01-11 PROCEDURE — 99072 ADDL SUPL MATRL&STAF TM PHE: CPT

## 2023-01-11 NOTE — DISCUSSION/SUMMARY
[de-identified] : Acupuncture still requested - she is breastfeeding so she is limited on the medications she can take. She is working part time, full duty - fu 3 months\par \par Patient seen by "Leah BRANDT" under the supervision of "Dr. Darrius Barbosa"\par

## 2023-01-11 NOTE — HISTORY OF PRESENT ILLNESS
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 Straightening of lordosis.\par 2. C3-C4: Loss of disc signal. Broad bulge with no herniation or stenosis. Luschka hypertrophy with no foraminal encroachment.\par 3. C4-C5: Loss of disc signal. Broad bulge and central herniation with no contact of cord or stenosis. Luschka hypertrophy with no foraminal encroachment\par \par MRi L spine 2/5/20 - 1. Convex right curvature.\par 2. L4-L5: No herniation, foraminal encroachment or stenosis. Facet hypertrophy and ligamentum flavum hypertrophy\par 3. L5-S1: Loss of disc signal and height. Minor retrolisthesis. Broad bulge and facet hypertrophy with foraminal encroachment and impingement on the exiting nerve roots. Central herniation and annular fissure impressing on the thecal sac with mild central stenosis.\par \par emg/ncs not done\par \par 9/8/21: here for f/u. Plan at last "c4.3 for the neck and back done - rec\par acupuncture for pain relief." She is at baseline with her pain and sx. She does well with acupuncture.\par \par 11/3/21: here for f/up. she has not been to acupuncture since last visit due to her schedule. She is 4 months pregnant today.\par \par 2/2/22: here for - pain in the neck to the right shoulder - spasms and at times feels like its pulsing. 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 Patient is 9 months pregnant. Will restart acupuncture and pain management once patient gives birth. RTO 6-8 weeks. Overall in pain still, pain to the right side of the neck that shoots down the arm occassionally. No new onset weakness. She is out of work on maternity leave.\par \par 8/10/22: Here for follow up. No change in symptoms. Back to work full duty. Has not been able to make it to acupuncture as much as she wants. Currently nursing.\par \par 10/12/22: Here for fu; plan last visit was, "Will continue to request acupuncture as this has been helpful - limited on meds as she is breastfeeding and post partum - working full duty - fu 8 weeks. " Overall doing worse, feels pain to the right  side of the neck; recently started working part time instead of full time and feels she will have more time to get treatment.\par \par 11/30/22: Here for fu; plan last visit was, "Will re start PT and accupuncture as this has been helpful - she is breastfeeding so she is limited on the medications she can take. She is working part time, full duty - fu 6 weeks" Overall good days and bad days; pain to the right trap at times.- has been unable to get acupuncture, but has recently gone down to part time. No new onset weakness.\par \par 1/11/23: Here for fu; plan last visit was," Will continue to recommend acupuncture as this has been successful in the past - she is breastfeeding so she is limited on the medications she can take. She is working part time, full duty - fu 6 weeks" Overall no changes in her symptoms. The acupuncture has not been approved. Pain to the right trap area still persists.  [] : Post Surgical Visit: no [FreeTextEntry1] : c spine and right shoulder  [FreeTextEntry7] : down into the right shoulder  [de-identified] : acupuncture

## 2023-02-24 NOTE — HISTORY OF PRESENT ILLNESS
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 Straightening of lordosis.\par 2. C3-C4: Loss of disc signal. Broad bulge with no herniation or stenosis. Luschka hypertrophy with no foraminal encroachment.\par 3. C4-C5: Loss of disc signal. Broad bulge and central herniation with no contact of cord or stenosis. Luschka hypertrophy with no foraminal encroachment\par \par MRi L spine 2/5/20 - 1. Convex right curvature.\par 2. L4-L5: No herniation, foraminal encroachment or stenosis. Facet hypertrophy and ligamentum flavum hypertrophy\par 3. L5-S1: Loss of disc signal and height. Minor retrolisthesis. Broad bulge and facet hypertrophy with foraminal encroachment and impingement on the exiting nerve roots. Central herniation and annular fissure impressing on the thecal sac with mild central stenosis.\par \par emg/ncs not done\par \par 9/8/21: here for f/u. Plan at last "c4.3 for the neck and back done - rec\par acupuncture for pain relief." She is at baseline with her pain and sx. She does well with acupuncture.\par \par 11/3/21: here for f/up. she has not been to acupuncture since last visit due to her schedule. She is 4 months pregnant today.\par \par 2/2/22: here for - pain in the neck to the right shoulder - spasms and at times feels like its pulsing. 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 Patient is 9 months pregnant. Will restart acupuncture and pain management once patient gives birth. RTO 6-8 weeks. Overall in pain still, pain to the right side of the neck that shoots down the arm occassionally. No new onset weakness. She is out of work on maternity leave.\par \par 8/10/22: Here for follow up. No change in symptoms. Back to work full duty. Has not been able to make it to acupuncture as much as she wants. Currently nursing.\par \par 10/12/22: Here for fu; plan last visit was, "Will continue to request acupuncture as this has been helpful - limited on meds as she is breastfeeding and post partum - working full duty - fu 8 weeks. " Overall doing worse, feels pain to the right  side of the neck; recently started working part time instead of full time and feels she will have more time to get treatment. [] : Post Surgical Visit: no [FreeTextEntry1] : c spine and right shoulder  [FreeTextEntry7] : down into the right shoulder  [de-identified] : acupuncture no transient paralysis/no weakness/no paresthesias/no generalized seizures/no focal seizures/no syncope/no tremors/no vertigo/no loss of sensation/no difficulty walking/no headache/no loss of consciousness/no hemiparesis/no confusion/no facial palsy

## 2023-04-05 ENCOUNTER — APPOINTMENT (OUTPATIENT)
Dept: ORTHOPEDIC SURGERY | Facility: CLINIC | Age: 38
End: 2023-04-05

## 2024-01-29 ENCOUNTER — APPOINTMENT (OUTPATIENT)
Dept: ORTHOPEDIC SURGERY | Facility: CLINIC | Age: 39
End: 2024-01-29
Payer: OTHER MISCELLANEOUS

## 2024-01-29 VITALS — HEIGHT: 65 IN | BODY MASS INDEX: 22.33 KG/M2 | WEIGHT: 134 LBS

## 2024-01-29 PROCEDURE — 73140 X-RAY EXAM OF FINGER(S): CPT | Mod: RT

## 2024-01-29 PROCEDURE — 99213 OFFICE O/P EST LOW 20 MIN: CPT

## 2024-01-29 NOTE — WORK
[Sprain/Strain] : sprain/strain [Was the competent medical cause of the injury] : was the competent medical cause of the injury [Are consistent with the injury] : are consistent with the injury [Consistent with my objective findings] : consistent with my objective findings [Partial] : partial [Does not reveal pre-existing condition(s) that may affect treatment/prognosis] : does not reveal pre-existing condition(s) that may affect treatment/prognosis [Cannot return to work because ________] : cannot return to work because [unfilled] [I provided the services listed above] :  I provided the services listed above.

## 2024-01-29 NOTE — HISTORY OF PRESENT ILLNESS
[Work related] : work related [9] : 9 [8] : 8 [Dull/Aching] : dull/aching [Sharp] : sharp [Nothing helps with pain getting better] : Nothing helps with pain getting better [de-identified] : 38 year old female presenting with a RT thumb injury. She states she hyperextended her thumb while assisting someone in using the bathroom. [facility] which she did not bring in with her.   DOI: 1/20/24  [] : no [FreeTextEntry5] : injured her thumb when moving a Pt to the toilet.  [de-identified] : X-rays [facility]

## 2024-01-29 NOTE — DISCUSSION/SUMMARY
[de-identified] : Discussed the nature of the diagnosis and risk and benefits of different modalities of treatment. RT thumb x-rays reveiwed and discussed.  CMC sprain. Thumb spica. RTO 10 days No work

## 2024-01-29 NOTE — PHYSICAL EXAM
[1st] : 1st [Metacarpal] : metacarpal [CMC Joint] : CMC joint [] : no erythema [Right] : right fingers [There are no fractures, subluxations or dislocations. No significant abnormalities are seen] : There are no fractures, subluxations or dislocations. No significant abnormalities are seen [No acute displaced fracture or dislocation] : No acute displaced fracture or dislocation [FreeTextEntry3] : UCL and RCL stable

## 2024-01-30 ENCOUNTER — APPOINTMENT (OUTPATIENT)
Dept: ORTHOPEDIC SURGERY | Facility: CLINIC | Age: 39
End: 2024-01-30

## 2024-02-08 ENCOUNTER — APPOINTMENT (OUTPATIENT)
Dept: ORTHOPEDIC SURGERY | Facility: CLINIC | Age: 39
End: 2024-02-08
Payer: OTHER MISCELLANEOUS

## 2024-02-08 VITALS — WEIGHT: 134 LBS | BODY MASS INDEX: 22.33 KG/M2 | HEIGHT: 65 IN

## 2024-02-08 PROCEDURE — 99213 OFFICE O/P EST LOW 20 MIN: CPT

## 2024-02-08 NOTE — WORK
[Partial] : partial [Cannot return to work because ________] : cannot return to work because [unfilled] [Unknown at this time] : : unknown at this time [I provided the services listed above] :  I provided the services listed above.

## 2024-02-08 NOTE — PHYSICAL EXAM
[Right] : right hand [FreeTextEntry3] : CMC remains tender. No appreciated swelling at this time  Opposes to 5th  head

## 2024-02-08 NOTE — HISTORY OF PRESENT ILLNESS
[Not working due to injury] : Work status: not working due to injury [8] : 8 [0] : 0 [Dull/Aching] : dull/aching [Sharp] : sharp [de-identified] : 38 year old female followed for RIGHT thumb CMC sprain.  Injury at work occurred 1-20-24 Has been wearing thumb spica.  Reports the injury feels better while she is wearing the splint, but feels the splint may be slightly tight around her thumb.  That when she removes the thumb, she has persistent pain at the base of the thumb at the level of the CMC [] : no [FreeTextEntry1] : RT THUMB  [FreeTextEntry3] : 1/20/24 [FreeTextEntry6] : weakness [de-identified] : THUMB SPICA

## 2024-02-08 NOTE — DISCUSSION/SUMMARY
[de-identified] : Conitnue with splint. Splint was adjusted to take pressure off volar aspect of thumb No work RTO 2 weeks

## 2024-02-22 ENCOUNTER — APPOINTMENT (OUTPATIENT)
Dept: ORTHOPEDIC SURGERY | Facility: CLINIC | Age: 39
End: 2024-02-22
Payer: OTHER MISCELLANEOUS

## 2024-02-22 VITALS — BODY MASS INDEX: 22.33 KG/M2 | WEIGHT: 134 LBS | HEIGHT: 65 IN

## 2024-02-22 DIAGNOSIS — Z78.9 OTHER SPECIFIED HEALTH STATUS: ICD-10-CM

## 2024-02-22 PROCEDURE — 99213 OFFICE O/P EST LOW 20 MIN: CPT

## 2024-02-22 NOTE — HISTORY OF PRESENT ILLNESS
[Work related] : work related [Sudden] : sudden [7] : 7 [de-identified] : 38 year old female followed for a RT CMC sprain. She has been splinting the wrist. She states her thumb gets numb and she has to take off the splint intermittently. Improved, not resolved. DRISS HOLLAND DOI:1/20/24  [FreeTextEntry3] : 01/20/2024 [FreeTextEntry5] : Hyperextended thumb helping someone at work.  [de-identified] : States her thumb has been going numb in the brace.

## 2024-02-22 NOTE — DISCUSSION/SUMMARY
[de-identified] : Discussed the nature of the diagnosis and risk and benefits of different modalities of treatment. She will D/C splinting after this week and assess her symptoms.  Start OT. OOW.  RTO 3 weeks.

## 2024-02-22 NOTE — WORK
[Partial] : partial [Cannot return to work because ________] : cannot return to work because [unfilled] [Patient] : patient [No Rx restrictions] : No Rx restrictions. [I provided the services listed above] :  I provided the services listed above.

## 2024-03-14 ENCOUNTER — APPOINTMENT (OUTPATIENT)
Dept: ORTHOPEDIC SURGERY | Facility: CLINIC | Age: 39
End: 2024-03-14
Payer: OTHER MISCELLANEOUS

## 2024-03-14 PROCEDURE — 99213 OFFICE O/P EST LOW 20 MIN: CPT

## 2024-03-14 NOTE — DISCUSSION/SUMMARY
[de-identified] : Discussed the nature of the diagnosis and risk and benefits of different modalities of treatment. She would like to continue with OT.  New rx provided. May RTW 3/18/24.  RTO 6 weeks.

## 2024-03-14 NOTE — HISTORY OF PRESENT ILLNESS
[Work related] : work related [Sudden] : sudden [6] : 6 [2] : 2 [Throbbing] : throbbing [Not working due to injury] : Work status: not working due to injury [de-identified] : 38 year old female followed for a RT CMC sprain. She has been attending OT. She is reporting improvement. DRISS HOLLAND DOI:1/20/24  [FreeTextEntry5] : Hyperextended thumb helping someone at work.  [] : no [FreeTextEntry3] : 01/20/2024 [de-identified] : PT

## 2024-03-19 ENCOUNTER — NON-APPOINTMENT (OUTPATIENT)
Age: 39
End: 2024-03-19

## 2024-04-25 ENCOUNTER — APPOINTMENT (OUTPATIENT)
Dept: ORTHOPEDIC SURGERY | Facility: CLINIC | Age: 39
End: 2024-04-25
Payer: OTHER MISCELLANEOUS

## 2024-04-25 ENCOUNTER — APPOINTMENT (OUTPATIENT)
Dept: ORTHOPEDIC SURGERY | Facility: CLINIC | Age: 39
End: 2024-04-25

## 2024-04-25 VITALS — HEIGHT: 65 IN | BODY MASS INDEX: 22.33 KG/M2 | WEIGHT: 134 LBS

## 2024-04-25 DIAGNOSIS — S63.8X1A SPRAIN OF OTHER PART OF RIGHT WRIST AND HAND, INITIAL ENCOUNTER: ICD-10-CM

## 2024-04-25 PROCEDURE — 99213 OFFICE O/P EST LOW 20 MIN: CPT

## 2024-04-25 NOTE — PHYSICAL EXAM
[Right] : right hand [FreeTextEntry3] : CMC non tender  No appreciated swelling at this time  Opposes to 5th  head

## 2024-04-25 NOTE — HISTORY OF PRESENT ILLNESS
[Work related] : work related [3] : 3 [0] : 0 [Dull/Aching] : dull/aching [Occasional] : occasional [Full time] : Work status: full time [de-identified] : 38 year old female followed for a RT CMC sprain. She has been attending OT. She reports 90% improvement. She has returned to work.  SKYLER DOI:1/20/24  [] : no [FreeTextEntry1] : RT thumb  [FreeTextEntry3] : 1/20/24 [de-identified] : OT

## 2024-04-25 NOTE — DISCUSSION/SUMMARY
[de-identified] : Discussed the nature of the diagnosis and risk and benefits of different modalities of treatment. Symptoms improved and are expected to continue to improve.  PRN.

## 2024-05-25 ENCOUNTER — EMERGENCY (EMERGENCY)
Facility: HOSPITAL | Age: 39
LOS: 0 days | Discharge: HOME | End: 2024-05-25
Attending: STUDENT IN AN ORGANIZED HEALTH CARE EDUCATION/TRAINING PROGRAM
Payer: COMMERCIAL

## 2024-05-25 VITALS
WEIGHT: 154.98 LBS | RESPIRATION RATE: 17 BRPM | TEMPERATURE: 99 F | OXYGEN SATURATION: 100 % | DIASTOLIC BLOOD PRESSURE: 72 MMHG | HEART RATE: 84 BPM | SYSTOLIC BLOOD PRESSURE: 120 MMHG

## 2024-05-25 VITALS — WEIGHT: 154.98 LBS | HEIGHT: 64 IN

## 2024-05-25 DIAGNOSIS — N70.11 CHRONIC SALPINGITIS: ICD-10-CM

## 2024-05-25 DIAGNOSIS — Z91.013 ALLERGY TO SEAFOOD: ICD-10-CM

## 2024-05-25 DIAGNOSIS — R10.2 PELVIC AND PERINEAL PAIN: ICD-10-CM

## 2024-05-25 DIAGNOSIS — R10.31 RIGHT LOWER QUADRANT PAIN: ICD-10-CM

## 2024-05-25 LAB
ALBUMIN SERPL ELPH-MCNC: 3.7 G/DL — SIGNIFICANT CHANGE UP (ref 3.3–5)
ALP SERPL-CCNC: 57 U/L — SIGNIFICANT CHANGE UP (ref 40–120)
ALT FLD-CCNC: 20 U/L — SIGNIFICANT CHANGE UP (ref 12–78)
ANION GAP SERPL CALC-SCNC: 5 MMOL/L — SIGNIFICANT CHANGE UP (ref 5–17)
APPEARANCE UR: CLEAR — SIGNIFICANT CHANGE UP
AST SERPL-CCNC: 7 U/L — LOW (ref 15–37)
BASOPHILS # BLD AUTO: 0.04 K/UL — SIGNIFICANT CHANGE UP (ref 0–0.2)
BASOPHILS NFR BLD AUTO: 0.5 % — SIGNIFICANT CHANGE UP (ref 0–2)
BILIRUB SERPL-MCNC: 0.9 MG/DL — SIGNIFICANT CHANGE UP (ref 0.2–1.2)
BILIRUB UR-MCNC: NEGATIVE — SIGNIFICANT CHANGE UP
BUN SERPL-MCNC: 15 MG/DL — SIGNIFICANT CHANGE UP (ref 7–23)
CALCIUM SERPL-MCNC: 8.7 MG/DL — SIGNIFICANT CHANGE UP (ref 8.5–10.1)
CHLORIDE SERPL-SCNC: 107 MMOL/L — SIGNIFICANT CHANGE UP (ref 96–108)
CO2 SERPL-SCNC: 26 MMOL/L — SIGNIFICANT CHANGE UP (ref 22–31)
COLOR SPEC: YELLOW — SIGNIFICANT CHANGE UP
CREAT SERPL-MCNC: 0.87 MG/DL — SIGNIFICANT CHANGE UP (ref 0.5–1.3)
DIFF PNL FLD: NEGATIVE — SIGNIFICANT CHANGE UP
EGFR: 87 ML/MIN/1.73M2 — SIGNIFICANT CHANGE UP
EOSINOPHIL # BLD AUTO: 0.23 K/UL — SIGNIFICANT CHANGE UP (ref 0–0.5)
EOSINOPHIL NFR BLD AUTO: 2.8 % — SIGNIFICANT CHANGE UP (ref 0–6)
GLUCOSE SERPL-MCNC: 97 MG/DL — SIGNIFICANT CHANGE UP (ref 70–99)
GLUCOSE UR QL: NEGATIVE MG/DL — SIGNIFICANT CHANGE UP
HCG SERPL-ACNC: <1 MIU/ML — SIGNIFICANT CHANGE UP
HCT VFR BLD CALC: 36.2 % — SIGNIFICANT CHANGE UP (ref 34.5–45)
HGB BLD-MCNC: 11.7 G/DL — SIGNIFICANT CHANGE UP (ref 11.5–15.5)
IMM GRANULOCYTES NFR BLD AUTO: 0.2 % — SIGNIFICANT CHANGE UP (ref 0–0.9)
KETONES UR-MCNC: NEGATIVE MG/DL — SIGNIFICANT CHANGE UP
LEUKOCYTE ESTERASE UR-ACNC: NEGATIVE — SIGNIFICANT CHANGE UP
LIDOCAIN IGE QN: 30 U/L — SIGNIFICANT CHANGE UP (ref 13–75)
LYMPHOCYTES # BLD AUTO: 1.74 K/UL — SIGNIFICANT CHANGE UP (ref 1–3.3)
LYMPHOCYTES # BLD AUTO: 20.9 % — SIGNIFICANT CHANGE UP (ref 13–44)
MCHC RBC-ENTMCNC: 31.1 PG — SIGNIFICANT CHANGE UP (ref 27–34)
MCHC RBC-ENTMCNC: 32.3 GM/DL — SIGNIFICANT CHANGE UP (ref 32–36)
MCV RBC AUTO: 96.3 FL — SIGNIFICANT CHANGE UP (ref 80–100)
MONOCYTES # BLD AUTO: 0.6 K/UL — SIGNIFICANT CHANGE UP (ref 0–0.9)
MONOCYTES NFR BLD AUTO: 7.2 % — SIGNIFICANT CHANGE UP (ref 2–14)
NEUTROPHILS # BLD AUTO: 5.71 K/UL — SIGNIFICANT CHANGE UP (ref 1.8–7.4)
NEUTROPHILS NFR BLD AUTO: 68.4 % — SIGNIFICANT CHANGE UP (ref 43–77)
NITRITE UR-MCNC: NEGATIVE — SIGNIFICANT CHANGE UP
PH UR: 7 — SIGNIFICANT CHANGE UP (ref 5–8)
PLATELET # BLD AUTO: 334 K/UL — SIGNIFICANT CHANGE UP (ref 150–400)
POTASSIUM SERPL-MCNC: 3.9 MMOL/L — SIGNIFICANT CHANGE UP (ref 3.5–5.3)
POTASSIUM SERPL-SCNC: 3.9 MMOL/L — SIGNIFICANT CHANGE UP (ref 3.5–5.3)
PROT SERPL-MCNC: 7.4 GM/DL — SIGNIFICANT CHANGE UP (ref 6–8.3)
PROT UR-MCNC: NEGATIVE MG/DL — SIGNIFICANT CHANGE UP
RBC # BLD: 3.76 M/UL — LOW (ref 3.8–5.2)
RBC # FLD: 13.3 % — SIGNIFICANT CHANGE UP (ref 10.3–14.5)
SODIUM SERPL-SCNC: 138 MMOL/L — SIGNIFICANT CHANGE UP (ref 135–145)
SP GR SPEC: 1.02 — SIGNIFICANT CHANGE UP (ref 1–1.03)
UROBILINOGEN FLD QL: 1 MG/DL — SIGNIFICANT CHANGE UP (ref 0.2–1)
WBC # BLD: 8.34 K/UL — SIGNIFICANT CHANGE UP (ref 3.8–10.5)
WBC # FLD AUTO: 8.34 K/UL — SIGNIFICANT CHANGE UP (ref 3.8–10.5)

## 2024-05-25 PROCEDURE — 83690 ASSAY OF LIPASE: CPT

## 2024-05-25 PROCEDURE — 80053 COMPREHEN METABOLIC PANEL: CPT

## 2024-05-25 PROCEDURE — 81003 URINALYSIS AUTO W/O SCOPE: CPT

## 2024-05-25 PROCEDURE — 87086 URINE CULTURE/COLONY COUNT: CPT

## 2024-05-25 PROCEDURE — 36415 COLL VENOUS BLD VENIPUNCTURE: CPT

## 2024-05-25 PROCEDURE — 99284 EMERGENCY DEPT VISIT MOD MDM: CPT

## 2024-05-25 PROCEDURE — 85025 COMPLETE CBC W/AUTO DIFF WBC: CPT

## 2024-05-25 PROCEDURE — 96374 THER/PROPH/DIAG INJ IV PUSH: CPT | Mod: XU

## 2024-05-25 PROCEDURE — 76830 TRANSVAGINAL US NON-OB: CPT | Mod: 26

## 2024-05-25 PROCEDURE — 96375 TX/PRO/DX INJ NEW DRUG ADDON: CPT

## 2024-05-25 PROCEDURE — 84702 CHORIONIC GONADOTROPIN TEST: CPT

## 2024-05-25 PROCEDURE — 99284 EMERGENCY DEPT VISIT MOD MDM: CPT | Mod: 25

## 2024-05-25 PROCEDURE — 76830 TRANSVAGINAL US NON-OB: CPT

## 2024-05-25 RX ORDER — ACETAMINOPHEN 500 MG
1000 TABLET ORAL ONCE
Refills: 0 | Status: COMPLETED | OUTPATIENT
Start: 2024-05-25 | End: 2024-05-25

## 2024-05-25 RX ORDER — KETOROLAC TROMETHAMINE 30 MG/ML
30 SYRINGE (ML) INJECTION ONCE
Refills: 0 | Status: DISCONTINUED | OUTPATIENT
Start: 2024-05-25 | End: 2024-05-25

## 2024-05-25 RX ADMIN — Medication 30 MILLIGRAM(S): at 13:29

## 2024-05-25 RX ADMIN — Medication 400 MILLIGRAM(S): at 10:59

## 2024-05-25 NOTE — ED PROVIDER NOTE - ATTENDING CONTRIBUTION TO CARE
I, Brant Rudolph DO, personally made/approved the management plan and take responsibility for the patient management. I performed the initial face to face bedside interview with this patient regarding history of present illness, review of symptoms and relevant past medical, social and family history.  I completed an independent physical examination.  I was the initial provider who evaluated this patient. I have signed out the follow up of any pending tests (i.e. labs, radiological studies) to the resident.  I have communicated the patient’s plan of care and disposition with the resident.

## 2024-05-25 NOTE — ED PROVIDER NOTE - PATIENT PORTAL LINK FT
You can access the FollowMyHealth Patient Portal offered by [facility] by registering at the following website: [email or web address]. By joining [employer]’s FollowMyHealth portal, you will also be able to view your health information using other applications (apps) compatible with our system.

## 2024-05-25 NOTE — ED ADULT NURSE NOTE - NSFALLUNIVINTERV_ED_ALL_ED
Bed/Stretcher in lowest position, wheels locked, appropriate side rails in place/Call bell, personal items and telephone in reach/Instruct patient to call for assistance before getting out of bed/chair/stretcher/Non-slip footwear applied when patient is off stretcher/[place] to call system/Physically safe environment - no spills, clutter or unnecessary equipment/Purposeful proactive rounding/Room/bathroom lighting operational, light cord in reach

## 2024-05-25 NOTE — ED PROVIDER NOTE - OBJECTIVE STATEMENT
40 y/o female with PMHx of ovarian cysts presents to ED c/o right sided groin/pelvic pain. Onset of symptoms began yesterday. Pt's last menstrual period was on 5/15. Pt endorses pelvic 'pressure'. Pt denies vaginal bleeding or discharge, dysuria, or hematuria. Pt took no meds PTA. Pt has no known medical/drug allergies

## 2024-05-25 NOTE — ED PROVIDER NOTE - NSFOLLOWUPINSTRUCTIONS_ED_ALL_ED_FT
Please follow up with your OB/Gyn within 2-3 days.   Return to the ER for any new or concerning symptoms.   You may take 975 mg acetaminophen every 6 hours as needed for pain.  You may take 600mg Ibuprofen (Advil) once every 8 hours as needed for pain. See medication label for warnings and use instructions.     An ovarian cyst is a sac that forms on the ovary and swells up with fluid. If the cyst breaks open, it is called a ruptured ovarian cyst. Sometimes a cyst may rupture and then form again. Sometimes a cyst may partly break open. This can release blood and fluid into the lower belly and pelvis.    You may not have symptoms from the cyst. But if it is large, or if it twists or bleeds, you may have pain or other problems. You may feel pain because the fluid irritates the pelvis.    Call 911 anytime you think you may need emergency care. For example, call if:    You passed out (lost consciousness).  Call your doctor or nurse advice line now or seek immediate medical care if:    You have severe vaginal bleeding.  You are dizzy or light-headed, or you feel like you may faint.  You have new or worse pain in your belly or pelvis.

## 2024-05-25 NOTE — ED ADULT NURSE NOTE - OBJECTIVE STATEMENT
38 y/o female with PMHx of ovarian cysts presents to ED c/o right sided groin/pelvic pain. Onset of symptoms began yesterday. Pt's last menstrual period was on 5/15. Pt endorses pelvic 'pressure'. Pt denies vaginal bleeding or discharge, dysuria, or hematuria. Pt took no meds PTA.

## 2024-05-25 NOTE — ED PROVIDER NOTE - NSICDXPASTMEDICALHX_GEN_ALL_CORE_FT
Unable to continue, he is taking a medication that may cause drug interaction and it was not intended to have refills.     PAST MEDICAL HISTORY:  No pertinent past medical history

## 2024-05-25 NOTE — ED PROVIDER NOTE - PHYSICAL EXAMINATION
GENERAL: A&Ox4, non-toxic appearing, no acute distress  HEENT: NCAT, EOMI, oral mucosa moist, normal conjunctiva  RESP: no respiratory distress, speaking in full sentences  CV: RRR  ABDOMEN: soft, mild RLQ tenderness, non-distended, no guarding, no rebound tenderness  MSK: no visible deformities  NEURO: no focal sensory or motor deficits, CN 2-12 grossly intact  SKIN: warm, normal color, well perfused, no rash  PSYCH: normal affect

## 2024-05-25 NOTE — ED PROVIDER NOTE - PROGRESS NOTE DETAILS
Gume, PGY-3, EM: pt US showing free fluid, ovaries w/ good flow b/l. R hydrosalpinx, pt states she has previously had this. will give a dose of toradol here and discharge.

## 2024-05-25 NOTE — ED ADULT TRIAGE NOTE - CHIEF COMPLAINT QUOTE
Pt presents to ER c/o right sided groin/pelvic pain. Onset of symptoms began yesterday. LMP was 5/9. Pt reports hx of ovarian cysts

## 2024-05-25 NOTE — ED PROVIDER NOTE - CLINICAL SUMMARY MEDICAL DECISION MAKING FREE TEXT BOX
39-year-old female presenting with right lower quadrant abdominal pain, has a history of ovarian cysts with surgical intervention.  Feels similar to prior.  Patient overall well-appearing, has mild right lower quadrant tenderness.  Most likely ovarian cyst, consider appendicitis.  Will start with blood work and transvaginal ultrasound, escalate to CT if ultrasound unremarkable.

## 2024-05-25 NOTE — ED PROVIDER NOTE - NSFOLLOWUPCLINICS_GEN_ALL_ED_FT
[facility]  Family Medicine  [address] [place] [address]  Phone: [phone]  Fax:

## 2024-05-26 LAB
CULTURE RESULTS: SIGNIFICANT CHANGE UP
SPECIMEN SOURCE: SIGNIFICANT CHANGE UP

## 2024-05-29 ENCOUNTER — NON-APPOINTMENT (OUTPATIENT)
Age: 39
End: 2024-05-29

## 2024-06-10 ENCOUNTER — APPOINTMENT (OUTPATIENT)
Dept: ORTHOPEDIC SURGERY | Facility: CLINIC | Age: 39
End: 2024-06-10

## 2024-06-25 ENCOUNTER — ASOB RESULT (OUTPATIENT)
Age: 39
End: 2024-06-25

## 2024-06-25 ENCOUNTER — APPOINTMENT (OUTPATIENT)
Dept: ANTEPARTUM | Facility: CLINIC | Age: 39
End: 2024-06-25
Payer: COMMERCIAL

## 2024-06-25 PROCEDURE — 76856 US EXAM PELVIC COMPLETE: CPT | Mod: 59

## 2024-06-25 PROCEDURE — 76830 TRANSVAGINAL US NON-OB: CPT

## 2024-08-08 NOTE — ED PROVIDER NOTE - CHIEF COMPLAINT
Problem: Potential for Falls  Goal: Patient will remain free of falls  Description: INTERVENTIONS:  - Educate patient/family on patient safety including physical limitations  - Instruct patient to call for assistance with activity   - Consult OT/PT to assist with strengthening/mobility   - Keep Call bell within reach  - Keep bed low and locked with side rails adjusted as appropriate  - Keep care items and personal belongings within reach  - Initiate and maintain comfort rounds  - Make Fall Risk Sign visible to staff  - Apply yellow socks and bracelet for high fall risk patients  - Consider moving patient to room near nurses station  Outcome: Progressing     Problem: Knowledge Deficit  Goal: Patient/family/caregiver demonstrates understanding of disease process, treatment plan, medications, and discharge instructions  Description: Complete learning assessment and assess knowledge base.  Interventions:  - Provide teaching at level of understanding  - Provide teaching via preferred learning methods  Outcome: Progressing     
The patient is a 34y Female complaining of fall.

## 2024-08-23 ENCOUNTER — APPOINTMENT (OUTPATIENT)
Dept: ORTHOPEDIC SURGERY | Facility: CLINIC | Age: 39
End: 2024-08-23
Payer: OTHER MISCELLANEOUS

## 2024-08-23 VITALS — HEIGHT: 65 IN | BODY MASS INDEX: 22.33 KG/M2 | WEIGHT: 134 LBS

## 2024-08-23 DIAGNOSIS — M77.12 LATERAL EPICONDYLITIS, LEFT ELBOW: ICD-10-CM

## 2024-08-23 PROCEDURE — 99213 OFFICE O/P EST LOW 20 MIN: CPT

## 2024-08-23 NOTE — DISCUSSION/SUMMARY
[de-identified] : Discussed the nature of the diagnosis and risk and benefits of different modalities of treatment. LT lateral epicondylitis  She will use a modified lifting technique She will begin PT Rx provided She will wear a tennis strap with activy Rx provided RTO 5 weeks

## 2024-08-23 NOTE — HISTORY OF PRESENT ILLNESS
SUBJECTIVE:   Libertad Russell is a 50 year old female who presents to clinic today for the following health issues:    Medication Followup of Adderall    Taking Medication as prescribed: yes    Side Effects:  None    Medication Helping Symptoms:  yes                                                                                                     Some-                                                                        Never   Rarely      times       Often  Very Often  1. How often do you have trouble wrapping up the final details of a project,  once the challenging parts have been done?   x     2. How often do you have difficulty getting things in order when you have to do a task that requires organization?   x     3. How often do you have problems remembering appointments or obligations?  x      4. When you have a task that requires a lot of thought, how often do you avoid or delay getting started?   x     5. How often do you fidget or squirm with your hands or feet when you have to sit down for a long time? x       6. How often do you feel overly active and compelled to do things, like you were driven by a motor? x         Part A                                                        7. How often do you make careless mistakes when you have to work on a boring or difficult project?   x     8. How often do you have difficulty keeping your attention when you are doing boring or repetitive work?   x     9. How often do you have difficulty concentrating on what people say to you, even when they are speaking to you directly?  x      10. How often do you misplace or have difficulty finding things at home or at work?   x     11. How often are you distracted by activity or noise around you?   x     12. How often do you leave your seat in meetings or other situations in which you are expected to remain seated?   x       13. How often do you feel restless or fidgety?   x       14. How often do you have difficulty  [] : yes unwinding and relaxing when you have time to yourself? x       15. How often do you find yourself talking too much when you are in social situations? x       16. When you're in a conversation, how often do you find yourself finishing the sentences of the people you are talking to, before they can finish them themselves?  x      17. How often do you have difficulty waiting your turn in situations when turn-taking is required? x       18. How often do you interrupt others when they are busy?  x             Concern - Mass  Onset: 2years    Description:   Lump on back of head x2 years, lump upper right arm x2 weeks    Intensity: mild    Progression of Symptoms:  same    Accompanying Signs & Symptoms:  none    Previous history of similar problem:   Lump on head has been looked at in the past    Precipitating factors:   Worsened by: none    Alleviating factors:  Improved by: none    Therapies Tried and outcome: none     Hypertension Follow-up      Outpatient blood pressures are being checked at work.  Results are 145/90. Blood pressures have been running  140/90     Low Salt Diet: no added salt      Amount of exercise or physical activity: 4-5 days/week at work    Problems taking medications regularly: No    Medication side effects: none    Diet: regular (no restrictions)        Problem list and histories reviewed & adjusted, as indicated.  Additional history: as documented    Patient Active Problem List   Diagnosis     Tobacco use disorder     Seasonal allergic rhinitis     Attention deficit disorder of adult     CARDIOVASCULAR SCREENING; LDL GOAL LESS THAN 160     Rosacea     Raynaud phenomenon     General medical exam     Essential hypertension with goal blood pressure less than 140/90     Past Surgical History:   Procedure Laterality Date     BREAST SURGERY       COSMETIC SURGERY      breast implants     EYE SURGERY  2015    [facility]     LYMPH NODE BIOPSY  2001    told to be benign     SURGICAL HISTORY OF -   2000, 1998     breast biopsies     TUBAL LIGATION  2003       Social History   Substance Use Topics     Smoking status: Current Every Day Smoker     Packs/day: 1.00     Years: 15.00     Types: Cigarettes     Smokeless tobacco: Never Used     Alcohol use Yes      Comment: occasional     Family History   Problem Relation Age of Onset     Hypertension Mother      Cancer Mother      lung cancer     Other Cancer Mother      Lung     C.A.D. Father      52 at first MI     Hypertension Other      Diabetes No family hx of      Cerebrovascular Disease No family hx of      Breast Cancer No family hx of      Cancer - colorectal No family hx of          Current Outpatient Prescriptions   Medication Sig Dispense Refill     [START ON 2/5/2019] amphetamine-dextroamphetamine (ADDERALL XR) 20 MG per 24 hr capsule Take 1 capsule (20 mg) by mouth daily 30 capsule 0     amphetamine-dextroamphetamine (ADDERALL XR) 20 MG per 24 hr capsule Take 1 capsule (20 mg) by mouth daily 30 capsule 0     [START ON 2/5/2019] amphetamine-dextroamphetamine (ADDERALL) 20 MG per tablet Take 1 tablet (20 mg) by mouth daily Take in the early afternoon 30 tablet 0     [START ON 1/8/2019] amphetamine-dextroamphetamine (ADDERALL) 20 MG per tablet Take 1 tablet (20 mg) by mouth daily Take in the early afternoon 30 tablet 0     aspirin 81 MG tablet Take 1 tablet (81 mg) by mouth daily Medication is being filled for 1 time refill only due to:  Patient needs to be seen because it has been more than one year since last visit. 100 tablet 0     losartan (COZAAR) 50 MG tablet Take 1 tablet (50 mg) by mouth daily 30 tablet 1     metroNIDAZOLE (METROGEL) 0.75 % topical gel Apply 1 g topically 2 times daily 45 g 0     ALPRAZolam (XANAX) 0.25 MG tablet Take 1 tablet 1 hour before flying and repeat if needed (Patient not taking: Reported on 9/4/2018) 4 tablet 0     amphetamine-dextroamphetamine (ADDERALL) 20 MG per tablet Take 1 tablet (20 mg) by mouth daily Take in the early  [de-identified] : 40yo female presenting with lateral LT elbow pain after work related accident on 6/2/24. 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 Went to  yesterday- x-ray done.    WC: 6/2/24 "afternoon 30 tablet 0     No Known Allergies  BP Readings from Last 3 Encounters:   11/15/18 (!) 174/94   09/04/18 (!) 145/95   09/21/17 114/62    Wt Readings from Last 3 Encounters:   11/15/18 133 lb (60.3 kg)   09/04/18 126 lb 11.2 oz (57.5 kg)   09/21/17 125 lb (56.7 kg)                    Reviewed and updated as needed this visit by clinical staff       Reviewed and updated as needed this visit by Provider         ROS:  CONSTITUTIONAL: NEGATIVE for fever, chills, change in weight  SKIN :  Does have a lump on the back of the head. And under the right armpit   Cyst on the back of the head   ENT/MOUTH: NEGATIVE for ear, mouth and throat problems  RESP: NEGATIVE for significant cough or SOB  CV: POSITIVE for elevated blood pressure.   Is taking blood pressure outside of the clinic    PSYCHIATRIC: POSITIVE for anxiety, concentration difficulty and ADD is working well.      OBJECTIVE:     BP (!) 174/94 (BP Location: Left arm, Cuff Size: Adult Regular)  Pulse 78  Temp 98.3  F (36.8  C) (Tympanic)  Ht 5' 4\" (1.626 m)  Wt 133 lb (60.3 kg)  LMP 11/01/2018 (Approximate)  SpO2 100%  Breastfeeding? No  BMI 22.83 kg/m2  Body mass index is 22.83 kg/(m^2).   GENERAL: healthy, alert and no distress  RESP: lungs clear to auscultation - no rales, rhonchi or wheezes  CV: regular rates and rhythm, normal S1 S2, no S3 or S4, no murmur, click or rub, peripheral pulses strong and no peripheral edema.B/P is not controlled   ABDOMEN: soft, nontender, no hepatosplenomegaly, no masses and bowel sounds normal  SKIN  Small cyst vs  Lipoma in right axilla   PSYCH: mentation appears normal, affect normal/bright, judgement and insight intact, appearance well groomed and does have minimal anxiety     Diagnostic Test Results:  none     ASSESSMENT/PLAN:     ASSESSMENT/PLAN:      ICD-10-CM    1. Attention deficit disorder of adult F98.8 amphetamine-dextroamphetamine (ADDERALL XR) 20 MG per 24 hr capsule     amphetamine-dextroamphetamine " [FreeTextEntry1] : LT elbow (ADDERALL XR) 20 MG per 24 hr capsule     amphetamine-dextroamphetamine (ADDERALL) 20 MG per tablet     amphetamine-dextroamphetamine (ADDERALL) 20 MG per tablet   2. Essential hypertension with goal blood pressure less than 140/90 I10 Albumin Random Urine Quantitative with Creat Ratio     losartan (COZAAR) 100 MG tablet     DISCONTINUED: losartan (COZAAR) 50 MG tablet   3. Encounter for screening mammogram for malignant neoplasm of breast Z12.31 MA Screening Digital Bilateral   4. Special screening for malignant neoplasms, colon Z12.11 GASTROENTEROLOGY ADULT REF PROCEDURE ONLY [facility] [phone]     Fecal colorectal cancer screen (FIT)   5. Benign lipomatous neoplasm of skin and subcutaneous tissue of trunk D17.1 GENERAL SURG ADULT REFERRAL       Patient Instructions   You do need to slow down.   Write the things down that you are thinking about and try to figure out a plan   For the anxiety   -- figure out the  Triggers for your anxiety and then figure out coping mechanisms       Continue with the Adderall at the same dose     Exercise outside of work  - this will help to decrease stress.        Increased the dose of  Losartan to 100 mg       You are due for a physical , pap smear.    Get your mammogram done- referral  and please do the FIT test,     For the Lipoma in the right arm pit  See a general surgeon to have it removed    The cyst on the back of the head could be lanced here or by the surgeon                    MEDICATIONS:        - Increase Losartan  to 100 mg        - Continue other medications without change  CONSULTATION/REFERRAL to surgeon   See Patient Instructions    HANNA CONTE NP, APRN [facility]     [FreeTextEntry5] : WC 9/2/24- 40yo female presenting with LT elbow pain after work related accident on 6/2/24. 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 Went to  yesterday- x-ray done.

## 2024-08-23 NOTE — WORK
[Repetitive Strain Injury] : repetitive strain injury [Was the competent medical cause of the injury] : was the competent medical cause of the injury [Are consistent with the injury] : are consistent with the injury [Consistent with my objective findings] : consistent with my objective findings [Partial] : partial [Does not reveal pre-existing condition(s) that may affect treatment/prognosis] : does not reveal pre-existing condition(s) that may affect treatment/prognosis [Can return to work without limitations on ______] : can return to work without limitations on [unfilled] [No Rx restrictions] : No Rx restrictions. [I provided the services listed above] :  I provided the services listed above.

## 2024-09-27 ENCOUNTER — APPOINTMENT (OUTPATIENT)
Dept: ORTHOPEDIC SURGERY | Facility: CLINIC | Age: 39
End: 2024-09-27

## 2024-09-30 ENCOUNTER — APPOINTMENT (OUTPATIENT)
Dept: ORTHOPEDIC SURGERY | Facility: CLINIC | Age: 39
End: 2024-09-30
Payer: OTHER MISCELLANEOUS

## 2024-09-30 VITALS — HEIGHT: 65 IN | BODY MASS INDEX: 22.33 KG/M2 | WEIGHT: 134 LBS

## 2024-09-30 DIAGNOSIS — M77.12 LATERAL EPICONDYLITIS, LEFT ELBOW: ICD-10-CM

## 2024-09-30 PROCEDURE — 99213 OFFICE O/P EST LOW 20 MIN: CPT

## 2024-09-30 NOTE — DISCUSSION/SUMMARY
[de-identified] : Discussed the nature of the diagnosis and risk and benefits of different modalities of treatment. LT lateral epicondylitis  Discussed conservative management as symptoms demand vs CSI. She will continue with PT Rx provided  RTO 4 weeks

## 2024-09-30 NOTE — HISTORY OF PRESENT ILLNESS
[Sharp] : sharp [de-identified] : 39 year old female followed for LET elbow lateral epicondylitis secondary to work injury 6/2/24.  At her first visit 8/23/24, was prescribe strap and PT.  She states she has been to around 7 PT visits.  Strap has not arrived in the mail. She reports some improvement with PT but is still with pain.  [] : no [FreeTextEntry1] : LT elbow [FreeTextEntry3] : 6/2/24 [FreeTextEntry5] : Here for WC follow up for the LT elbow pain. Patients symptoms are unchanged. Pain is most present when it is stiff.

## 2024-10-28 ENCOUNTER — APPOINTMENT (OUTPATIENT)
Dept: ORTHOPEDIC SURGERY | Facility: CLINIC | Age: 39
End: 2024-10-28
Payer: OTHER MISCELLANEOUS

## 2024-10-28 DIAGNOSIS — M77.12 LATERAL EPICONDYLITIS, LEFT ELBOW: ICD-10-CM

## 2024-10-28 DIAGNOSIS — S63.8X1A SPRAIN OF OTHER PART OF RIGHT WRIST AND HAND, INITIAL ENCOUNTER: ICD-10-CM

## 2024-10-28 PROCEDURE — 99213 OFFICE O/P EST LOW 20 MIN: CPT

## 2024-12-09 ENCOUNTER — APPOINTMENT (OUTPATIENT)
Dept: ORTHOPEDIC SURGERY | Facility: CLINIC | Age: 39
End: 2024-12-09

## 2024-12-31 ENCOUNTER — APPOINTMENT (OUTPATIENT)
Dept: ANTEPARTUM | Facility: CLINIC | Age: 39
End: 2024-12-31
Payer: COMMERCIAL

## 2024-12-31 ENCOUNTER — ASOB RESULT (OUTPATIENT)
Age: 39
End: 2024-12-31

## 2024-12-31 PROCEDURE — 76857 US EXAM PELVIC LIMITED: CPT | Mod: 59

## 2024-12-31 PROCEDURE — 76830 TRANSVAGINAL US NON-OB: CPT

## 2025-05-14 NOTE — ED ADULT NURSE NOTE - CHIEF COMPLAINT QUOTE
Wheelchair/Stroller fever tmax 101 x couple of hours. + headache. Denies nausea, vomiting, diarrhea, sick contacts. No medication taken at home.